# Patient Record
Sex: FEMALE | Race: WHITE | NOT HISPANIC OR LATINO | Employment: FULL TIME | URBAN - METROPOLITAN AREA
[De-identification: names, ages, dates, MRNs, and addresses within clinical notes are randomized per-mention and may not be internally consistent; named-entity substitution may affect disease eponyms.]

---

## 2021-10-13 LAB
SARS-COV-2, PCR: NOT DETECTED
SPECIMEN SOURCE: NORMAL

## 2023-05-26 LAB
ALANINE AMINOTRANSFERASE (SGPT) (U/L) IN SER/PLAS: 16 U/L (ref 7–45)
ALBUMIN (G/DL) IN SER/PLAS: 4.3 G/DL (ref 3.4–5)
ALKALINE PHOSPHATASE (U/L) IN SER/PLAS: 79 U/L (ref 33–110)
ANION GAP IN SER/PLAS: 11 MMOL/L (ref 10–20)
ASPARTATE AMINOTRANSFERASE (SGOT) (U/L) IN SER/PLAS: 15 U/L (ref 9–39)
BASOPHILS (10*3/UL) IN BLOOD BY AUTOMATED COUNT: 0.1 X10E9/L (ref 0–0.1)
BASOPHILS/100 LEUKOCYTES IN BLOOD BY AUTOMATED COUNT: 0.8 % (ref 0–2)
BILIRUBIN TOTAL (MG/DL) IN SER/PLAS: 0.3 MG/DL (ref 0–1.2)
CALCIUM (MG/DL) IN SER/PLAS: 9.7 MG/DL (ref 8.6–10.6)
CARBON DIOXIDE, TOTAL (MMOL/L) IN SER/PLAS: 29 MMOL/L (ref 21–32)
CHLORIDE (MMOL/L) IN SER/PLAS: 101 MMOL/L (ref 98–107)
CREATININE (MG/DL) IN SER/PLAS: 0.61 MG/DL (ref 0.5–1.05)
EOSINOPHILS (10*3/UL) IN BLOOD BY AUTOMATED COUNT: 0.26 X10E9/L (ref 0–0.7)
EOSINOPHILS/100 LEUKOCYTES IN BLOOD BY AUTOMATED COUNT: 2 % (ref 0–6)
ERYTHROCYTE DISTRIBUTION WIDTH (RATIO) BY AUTOMATED COUNT: 13.3 % (ref 11.5–14.5)
ERYTHROCYTE MEAN CORPUSCULAR HEMOGLOBIN CONCENTRATION (G/DL) BY AUTOMATED: 33.3 G/DL (ref 32–36)
ERYTHROCYTE MEAN CORPUSCULAR VOLUME (FL) BY AUTOMATED COUNT: 89 FL (ref 80–100)
ERYTHROCYTES (10*6/UL) IN BLOOD BY AUTOMATED COUNT: 4.61 X10E12/L (ref 4–5.2)
GFR FEMALE: >90 ML/MIN/1.73M2
GLUCOSE (MG/DL) IN SER/PLAS: 119 MG/DL (ref 74–99)
HEMATOCRIT (%) IN BLOOD BY AUTOMATED COUNT: 41.2 % (ref 36–46)
HEMOGLOBIN (G/DL) IN BLOOD: 13.7 G/DL (ref 12–16)
HEPATITIS C VIRUS AB PRESENCE IN SERUM: NONREACTIVE
HIV 1/ 2 AG/AB SCREEN: NONREACTIVE
IMMATURE GRANULOCYTES/100 LEUKOCYTES IN BLOOD BY AUTOMATED COUNT: 0.4 % (ref 0–0.9)
LEUKOCYTES (10*3/UL) IN BLOOD BY AUTOMATED COUNT: 12.7 X10E9/L (ref 4.4–11.3)
LYMPHOCYTES (10*3/UL) IN BLOOD BY AUTOMATED COUNT: 2.91 X10E9/L (ref 1.2–4.8)
LYMPHOCYTES/100 LEUKOCYTES IN BLOOD BY AUTOMATED COUNT: 22.9 % (ref 13–44)
MONOCYTES (10*3/UL) IN BLOOD BY AUTOMATED COUNT: 0.5 X10E9/L (ref 0.1–1)
MONOCYTES/100 LEUKOCYTES IN BLOOD BY AUTOMATED COUNT: 3.9 % (ref 2–10)
NEUTROPHILS (10*3/UL) IN BLOOD BY AUTOMATED COUNT: 8.9 X10E9/L (ref 1.2–7.7)
NEUTROPHILS/100 LEUKOCYTES IN BLOOD BY AUTOMATED COUNT: 70 % (ref 40–80)
NRBC (PER 100 WBCS) BY AUTOMATED COUNT: 0 /100 WBC (ref 0–0)
PLATELETS (10*3/UL) IN BLOOD AUTOMATED COUNT: 318 X10E9/L (ref 150–450)
POTASSIUM (MMOL/L) IN SER/PLAS: 3.8 MMOL/L (ref 3.5–5.3)
PROTEIN TOTAL: 6.9 G/DL (ref 6.4–8.2)
SODIUM (MMOL/L) IN SER/PLAS: 137 MMOL/L (ref 136–145)
THYROTROPIN (MIU/L) IN SER/PLAS BY DETECTION LIMIT <= 0.05 MIU/L: 0.98 MIU/L (ref 0.44–3.98)
UREA NITROGEN (MG/DL) IN SER/PLAS: 12 MG/DL (ref 6–23)

## 2023-05-27 LAB
CHLAMYDIA TRACH., AMPLIFIED: NEGATIVE
N. GONORRHEA, AMPLIFIED: NEGATIVE

## 2023-08-31 LAB
CHLAMYDIA TRACH., AMPLIFIED: NEGATIVE
N. GONORRHEA, AMPLIFIED: NEGATIVE

## 2023-09-26 PROBLEM — M17.12 PRIMARY OSTEOARTHRITIS OF LEFT KNEE: Status: ACTIVE | Noted: 2023-09-26

## 2023-09-26 PROBLEM — E66.9 OBESITY: Status: ACTIVE | Noted: 2023-09-26

## 2023-09-26 PROBLEM — E87.6 HYPOKALEMIA: Status: ACTIVE | Noted: 2023-09-26

## 2023-09-26 PROBLEM — R09.81 SINUS CONGESTION: Status: ACTIVE | Noted: 2023-09-26

## 2023-09-26 PROBLEM — J02.9 SORE THROAT: Status: ACTIVE | Noted: 2023-09-26

## 2023-09-26 PROBLEM — M79.602 PAIN OF LEFT UPPER EXTREMITY: Status: ACTIVE | Noted: 2023-09-26

## 2023-09-26 PROBLEM — R06.83 SNORING: Status: ACTIVE | Noted: 2023-09-26
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
## 2023-09-26 PROBLEM — B96.89 BACTERIAL VAGINITIS: Status: ACTIVE | Noted: 2023-09-26

## 2023-09-26 PROBLEM — J34.89 STUFFY AND RUNNY NOSE: Status: ACTIVE | Noted: 2023-09-26

## 2023-09-26 PROBLEM — N76.0 BACTERIAL VAGINITIS: Status: ACTIVE | Noted: 2023-09-26

## 2023-09-26 PROBLEM — U07.1 COVID-19 VIRUS INFECTION: Status: ACTIVE | Noted: 2023-09-26

## 2023-09-26 PROBLEM — M25.562 LEFT KNEE PAIN: Status: ACTIVE | Noted: 2023-09-26

## 2023-09-26 PROBLEM — T30.0 BURN: Status: ACTIVE | Noted: 2023-09-26

## 2023-09-26 PROBLEM — R05.9 COUGH: Status: ACTIVE | Noted: 2023-09-26

## 2023-09-26 PROBLEM — R03.0 ELEVATED BLOOD PRESSURE READING: Status: ACTIVE | Noted: 2023-09-26

## 2023-09-26 PROBLEM — K21.9 ESOPHAGEAL REFLUX: Status: ACTIVE | Noted: 2023-09-26

## 2023-09-26 PROBLEM — N83.209 OVARIAN CYST: Status: ACTIVE | Noted: 2023-09-26
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
## 2023-09-26 PROBLEM — R53.83 FATIGUE: Status: ACTIVE | Noted: 2023-09-26

## 2023-09-26 PROBLEM — M25.561 RIGHT KNEE PAIN: Status: ACTIVE | Noted: 2023-09-26

## 2023-09-26 PROBLEM — R40.0 DAYTIME SLEEPINESS: Status: ACTIVE | Noted: 2023-09-26

## 2023-09-26 PROBLEM — R10.13 EPIGASTRIC PAIN: Status: ACTIVE | Noted: 2023-09-26

## 2023-09-26 PROBLEM — R51.9 HEADACHE: Status: ACTIVE | Noted: 2023-09-26

## 2023-09-26 PROBLEM — F41.9 ANXIETY: Status: ACTIVE | Noted: 2023-09-26

## 2023-09-26 PROBLEM — S00.83XA FOREHEAD CONTUSION: Status: ACTIVE | Noted: 2023-09-26

## 2023-09-26 PROBLEM — N92.0 MENORRHAGIA: Status: ACTIVE | Noted: 2023-09-26

## 2023-09-26 PROBLEM — N63.22 BREAST LUMP ON LEFT SIDE AT 10 O'CLOCK POSITION: Status: ACTIVE | Noted: 2023-09-26

## 2023-09-26 PROBLEM — K13.79 MOUTH SORE: Status: ACTIVE | Noted: 2023-09-26

## 2023-09-26 PROBLEM — E88.810 DYSMETABOLIC SYNDROME: Status: ACTIVE | Noted: 2023-09-26

## 2023-09-26 PROBLEM — R60.9 EDEMA: Status: ACTIVE | Noted: 2023-09-26

## 2023-09-26 PROBLEM — S83.249A MEDIAL MENISCUS TEAR: Status: ACTIVE | Noted: 2023-09-26

## 2023-09-26 PROBLEM — E66.3 OVERWEIGHT: Status: ACTIVE | Noted: 2023-09-26

## 2023-09-26 PROBLEM — M25.511 SHOULDER PAIN, RIGHT: Status: ACTIVE | Noted: 2023-09-26

## 2023-09-26 PROBLEM — I10 BENIGN HYPERTENSION: Status: ACTIVE | Noted: 2023-09-26

## 2023-09-26 PROBLEM — R03.0 BLOOD PRESSURE ELEVATED WITHOUT HISTORY OF HTN: Status: ACTIVE | Noted: 2023-09-26

## 2023-09-26 RX ORDER — MEDROXYPROGESTERONE ACETATE 10 MG/1
TABLET ORAL
COMMUNITY
Start: 2022-12-28 | End: 2023-10-12 | Stop reason: WASHOUT

## 2023-09-26 RX ORDER — HYDROCHLOROTHIAZIDE 12.5 MG/1
CAPSULE ORAL EVERY 24 HOURS
COMMUNITY
End: 2023-12-18 | Stop reason: SDUPTHER

## 2023-09-26 RX ORDER — METRONIDAZOLE 500 MG/1
TABLET ORAL
COMMUNITY
Start: 2022-11-27 | End: 2023-10-12 | Stop reason: WASHOUT

## 2023-09-26 RX ORDER — MULTIVITAMIN
1 TABLET ORAL DAILY
COMMUNITY
End: 2023-10-12 | Stop reason: WASHOUT

## 2023-09-26 RX ORDER — MULTIVITAMIN
TABLET ORAL
COMMUNITY
End: 2023-10-12 | Stop reason: WASHOUT

## 2023-09-26 RX ORDER — HYDROCHLOROTHIAZIDE 12.5 MG/1
1 TABLET ORAL
COMMUNITY
Start: 2015-08-21 | End: 2023-10-12 | Stop reason: WASHOUT

## 2023-09-26 RX ORDER — IVERMECTIN 10 MG/G
CREAM TOPICAL
COMMUNITY
End: 2023-10-12 | Stop reason: WASHOUT

## 2023-09-26 RX ORDER — NORETHINDRONE 0.35 MG/1
TABLET ORAL
COMMUNITY
Start: 2023-04-01 | End: 2023-12-04

## 2023-10-12 ENCOUNTER — PREP FOR PROCEDURE (OUTPATIENT)
Dept: OBSTETRICS AND GYNECOLOGY | Facility: CLINIC | Age: 48
End: 2023-10-12

## 2023-10-12 ENCOUNTER — OFFICE VISIT (OUTPATIENT)
Dept: OBSTETRICS AND GYNECOLOGY | Facility: CLINIC | Age: 48
End: 2023-10-12
Payer: COMMERCIAL

## 2023-10-12 VITALS
DIASTOLIC BLOOD PRESSURE: 62 MMHG | HEIGHT: 61 IN | WEIGHT: 223 LBS | BODY MASS INDEX: 42.1 KG/M2 | SYSTOLIC BLOOD PRESSURE: 110 MMHG

## 2023-10-12 DIAGNOSIS — N93.9 ABNORMAL UTERINE BLEEDING (AUB): Primary | ICD-10-CM

## 2023-10-12 PROCEDURE — 3078F DIAST BP <80 MM HG: CPT | Performed by: OBSTETRICS & GYNECOLOGY

## 2023-10-12 PROCEDURE — 3074F SYST BP LT 130 MM HG: CPT | Performed by: OBSTETRICS & GYNECOLOGY

## 2023-10-12 PROCEDURE — 99213 OFFICE O/P EST LOW 20 MIN: CPT | Performed by: OBSTETRICS & GYNECOLOGY

## 2023-10-12 NOTE — PROGRESS NOTES
Subjective   Patient ID: Abby Chisholm is a 48 y.o. female who presents for Follow-up.  HPI      47 y.o. G0 female presents for AUB.  H/O endometrial ablation and salpingectomy in 07/2020 with me.   2013 myosure resection of submucosal fibroid.  Continued to get regular cycles, every 24 days, lasting 7 days.   Seen for episode of AUB in 12/2022. Normal labs including CBC, TSH, prolactin. Pelvic US 01/02/2023 showed uterine fibroids, 1-3 mm, endometrium 8 mm.  Wanted to try birth control pills to see if this helped. Given h/o HTN, she was started on POPs and has been on them since.   She was doing okay with this then started having bleeding on 08/2023 and had daily heavy irregular bleeding during August.   She saw Katlin in August 2023 and wanted to meet with me to discuss options from here.   She is a non-smoker.   Medical history significant for anxiety, arthritis, hypertension.     Review of Systems    Objective   Physical Exam  Constitutional:       Appearance: Normal appearance.   Neurological:      Mental Status: She is alert.   Psychiatric:         Mood and Affect: Mood normal.      exam deferred.    Assessment/Plan     Abnormal uterine bleeding prior Endometrial ablation, Hydrothermal endometrial ablation.   We reviewed options.   We discussed continued medical management with progesterone only OC's, repeat endometrial ablation vs definitive surgical therapy with hysterectomy.   Pt wants to proceed with hydrothermal endometrial ablation, repeat procedure.   Return to office for endometrial biopsy.   Will continue on progesterone only OC's until the procedure.

## 2023-10-20 ENCOUNTER — OFFICE VISIT (OUTPATIENT)
Dept: OTOLARYNGOLOGY | Facility: CLINIC | Age: 48
End: 2023-10-20
Payer: COMMERCIAL

## 2023-10-20 VITALS — WEIGHT: 229.6 LBS | HEIGHT: 61 IN | BODY MASS INDEX: 43.35 KG/M2 | TEMPERATURE: 96.9 F

## 2023-10-20 DIAGNOSIS — K14.0 GLOSSITIS: Primary | ICD-10-CM

## 2023-10-20 PROCEDURE — 1036F TOBACCO NON-USER: CPT | Performed by: OTOLARYNGOLOGY

## 2023-10-20 PROCEDURE — 99213 OFFICE O/P EST LOW 20 MIN: CPT | Performed by: OTOLARYNGOLOGY

## 2023-10-20 ASSESSMENT — PATIENT HEALTH QUESTIONNAIRE - PHQ9
1. LITTLE INTEREST OR PLEASURE IN DOING THINGS: NOT AT ALL
SUM OF ALL RESPONSES TO PHQ9 QUESTIONS 1 & 2: 0
2. FEELING DOWN, DEPRESSED OR HOPELESS: NOT AT ALL

## 2023-10-20 NOTE — PROGRESS NOTES
HPI  Abby Chisholm is a 48 y.o. female here in 1 month follow-up on glossitis.  She is feeling well without any pain right now.  She used Guzman's Solution x1 course      Past Medical History:   Diagnosis Date    Acute pharyngitis, unspecified 01/14/2022    Sore throat    Anxiety disorder, unspecified 07/28/2021    Anxiety    Contusion of other part of head, initial encounter 10/24/2017    Forehead contusion    Cough, unspecified 01/14/2022    Cough    COVID-19     COVID-19 virus infection    Dorsalgia, unspecified 05/13/2016    Back pain    Edema, unspecified 11/22/2017    Edema    Elevated blood-pressure reading, without diagnosis of hypertension 02/21/2022    Blood pressure elevated without history of HTN    Encounter for immunization 09/21/2020    Need for tetanus booster    Encounter for immunization 09/24/2021    Flu vaccine need    Encounter for other screening for malignant neoplasm of breast 12/16/2022    Breast screening    Encounter for screening for malignant neoplasm of colon 10/08/2021    Colon cancer screening    Epigastric pain 03/31/2016    Epigastric pain    Essential (primary) hypertension 07/15/2022    Benign hypertension    Hypokalemia 05/13/2016    Hypokalemia    Nasal congestion 03/21/2022    Sinus congestion    Other abnormal and inconclusive findings on diagnostic imaging of breast 10/12/2020    Abnormal mammogram    Other fatigue 12/17/2021    Fatigue    Other lesions of oral mucosa 07/19/2016    Mouth sore    Other specified disorders of nose and nasal sinuses 01/14/2022    Stuffy and runny nose    Ovarian cyst     Ovarian cyst    Overweight 10/18/2019    Overweight    Pain in left arm 02/21/2022    Pain of left upper extremity    Pain in right toe(s) 09/24/2021    Toe pain, right    Pain, unspecified 02/21/2022    Aches    Somnolence 12/17/2021    Daytime sleepiness    Unspecified abdominal pain 03/31/2016    Abdominal pain    Unspecified fall, initial encounter 10/24/2017    Fall at  "home    Viral intestinal infection, unspecified 04/20/2022    Viral gastroenteritis    Vitamin D deficiency, unspecified 03/21/2022    Vitamin D deficiency            Medications:     Current Outpatient Medications:     hydroCHLOROthiazide (Microzide) 12.5 mg capsule, once every 24 hours., Disp: , Rfl:     norethindrone (Micronor) 0.35 mg tablet, , Disp: , Rfl:      Allergies:  Allergies   Allergen Reactions    Latex, Natural Rubber Rash and Shortness of breath    Tree Nuts Anaphylaxis    Hydrocodone-Acetaminophen Hives    Levofloxacin Swelling and Dry mouth        Physical Exam:  Last Recorded Vitals  Temperature 36.1 °C (96.9 °F), height 1.549 m (5' 1\"), weight 104 kg (229 lb 9.6 oz), last menstrual period 09/16/2023.  General:     General appearance: Well-developed, well-nourished in no acute distress.       Voice:  normal       Head/face: Normal appearance; nontender to palpation     Facial nerve function: Normal and symmetric bilaterally.    Oral/oropharynx:     Oral vestibule: Normal labial and gingival mucosa     Tongue/floor of mouth: Pink, deep fissures.  She does not have specific coating or ulceration and no specific lesions     Oropharynx: Clear.  No lesions present of the hard/soft palate, posterior pharynx    Neck:     Neck: Normal appearance, trachea midline     Salivary glands: Normal to palpation bilaterally     Lymph nodes: No cervical lymphadenopathy to palpation     Thyroid: No thyromegaly.  No palpable nodules     Range of motion: Normal    Neurological:     Cortical functions: Alert and oriented x3, appropriate affect       Larynx/hypopharynx:     Laryngeal findings: Mirror exam inadequate or limited secondary to enlarged base of tongue and/or excessive gagging    Ear:     Ear canal: Normal bilaterally     Tympanic membrane: Intact and mobile bilaterally     Pinna: Normal bilaterally     Hearing:  Gross hearing assessment normal by voice    Nose:     Visualized using: Anterior rhinoscopy     " Nasopharynx: Inadequate mirror exam secondary to gag, anatomy.       Nasal dorsum: Nontraumatic midline appearance     Septum: Midline     Inferior turbinates: Normally sized     Mucosa: Bilateral, pink, normal appearing       Assessment/Plan   She is feeling well now.  She does not have any suspicious findings.  Recommend observe for now and if symptoms recur, may resume [place] and may consider further work-up or treatment at that time         Orestes Scanlon MD

## 2023-10-24 ENCOUNTER — OFFICE VISIT (OUTPATIENT)
Dept: PRIMARY CARE | Facility: CLINIC | Age: 48
End: 2023-10-24
Payer: COMMERCIAL

## 2023-10-24 ENCOUNTER — LAB (OUTPATIENT)
Dept: LAB | Facility: LAB | Age: 48
End: 2023-10-24
Payer: COMMERCIAL

## 2023-10-24 VITALS — DIASTOLIC BLOOD PRESSURE: 79 MMHG | WEIGHT: 227 LBS | BODY MASS INDEX: 42.89 KG/M2 | SYSTOLIC BLOOD PRESSURE: 120 MMHG

## 2023-10-24 DIAGNOSIS — R79.9 ABNORMAL BLOOD CHEMISTRY: Primary | ICD-10-CM

## 2023-10-24 DIAGNOSIS — R79.9 ABNORMAL BLOOD CHEMISTRY: ICD-10-CM

## 2023-10-24 LAB
ANION GAP SERPL CALC-SCNC: 15 MMOL/L (ref 10–20)
BUN SERPL-MCNC: 14 MG/DL (ref 6–23)
CALCIUM SERPL-MCNC: 9.2 MG/DL (ref 8.6–10.6)
CHLORIDE SERPL-SCNC: 103 MMOL/L (ref 98–107)
CO2 SERPL-SCNC: 25 MMOL/L (ref 21–32)
CREAT SERPL-MCNC: 0.55 MG/DL (ref 0.5–1.05)
EST. AVERAGE GLUCOSE BLD GHB EST-MCNC: 114 MG/DL
GFR SERPL CREATININE-BSD FRML MDRD: >90 ML/MIN/1.73M*2
GLUCOSE SERPL-MCNC: 94 MG/DL (ref 74–99)
HBA1C MFR BLD: 5.6 %
POTASSIUM SERPL-SCNC: 4 MMOL/L (ref 3.5–5.3)
SODIUM SERPL-SCNC: 139 MMOL/L (ref 136–145)

## 2023-10-24 PROCEDURE — 3078F DIAST BP <80 MM HG: CPT | Performed by: INTERNAL MEDICINE

## 2023-10-24 PROCEDURE — 80048 BASIC METABOLIC PNL TOTAL CA: CPT

## 2023-10-24 PROCEDURE — 1036F TOBACCO NON-USER: CPT | Performed by: INTERNAL MEDICINE

## 2023-10-24 PROCEDURE — 90471 IMMUNIZATION ADMIN: CPT | Performed by: INTERNAL MEDICINE

## 2023-10-24 PROCEDURE — 90682 RIV4 VACC RECOMBINANT DNA IM: CPT | Performed by: INTERNAL MEDICINE

## 2023-10-24 PROCEDURE — 36415 COLL VENOUS BLD VENIPUNCTURE: CPT

## 2023-10-24 PROCEDURE — 83036 HEMOGLOBIN GLYCOSYLATED A1C: CPT

## 2023-10-24 PROCEDURE — 3074F SYST BP LT 130 MM HG: CPT | Performed by: INTERNAL MEDICINE

## 2023-10-24 PROCEDURE — 99213 OFFICE O/P EST LOW 20 MIN: CPT | Performed by: INTERNAL MEDICINE

## 2023-10-24 RX ORDER — METFORMIN HYDROCHLORIDE 500 MG/1
500 TABLET ORAL
Qty: 60 TABLET | Refills: 2 | Status: SHIPPED | OUTPATIENT
Start: 2023-10-24 | End: 2024-01-24

## 2023-10-24 ASSESSMENT — PATIENT HEALTH QUESTIONNAIRE - PHQ9
2. FEELING DOWN, DEPRESSED OR HOPELESS: NOT AT ALL
SUM OF ALL RESPONSES TO PHQ9 QUESTIONS 1 AND 2: 0
1. LITTLE INTEREST OR PLEASURE IN DOING THINGS: NOT AT ALL

## 2023-10-24 NOTE — PROGRESS NOTES
Subjective   Patient ID: Abby Chisholm is a 48 y.o. female who presents for EPV (Weight issues. Pt cannot concentrate.).    HPI  Patient in for a visit  Was going to [employer] , work and not as motivated , parents her mom   Knee pain     Review of Systems  General: Denies fever, chills, night sweats,  Eyes: Negative for recent visual changes  Ears, Nose, Throat :  Negative for hearing changes, sinus discomfort  Dermatologic: Negative for new skin conditions, rash  Respiratory: Negative for wheezing, shortness of breath, cough  Cardiovascular: Negative for chest pain, palpitations, or leg swelling  Gastrointestinal: Negative for nausea/vomiting, abdominal pain, changes in bowel habits  Genitourinary NEGATIVE FOR URINARY INCONTINENCE urgency , frequency, discomfort   Musculoskeletal: see hpi  Neurological: Negative for headaches, dizziness    Previous history  Past Medical History:   Diagnosis Date    Acute pharyngitis, unspecified 01/14/2022    Sore throat    Anxiety disorder, unspecified 07/28/2021    Anxiety    Contusion of other part of head, initial encounter 10/24/2017    Forehead contusion    Cough, unspecified 01/14/2022    Cough    COVID-19     COVID-19 virus infection    Dorsalgia, unspecified 05/13/2016    Back pain    Edema, unspecified 11/22/2017    Edema    Elevated blood-pressure reading, without diagnosis of hypertension 02/21/2022    Blood pressure elevated without history of HTN    Encounter for immunization 09/21/2020    Need for tetanus booster    Encounter for immunization 09/24/2021    Flu vaccine need    Encounter for other screening for malignant neoplasm of breast 12/16/2022    Breast screening    Encounter for screening for malignant neoplasm of colon 10/08/2021    Colon cancer screening    Epigastric pain 03/31/2016    Epigastric pain    Essential (primary) hypertension 07/15/2022    Benign hypertension    Hypokalemia 05/13/2016    Hypokalemia    Nasal congestion 03/21/2022     Sinus congestion    Other abnormal and inconclusive findings on diagnostic imaging of breast 10/12/2020    Abnormal mammogram    Other fatigue 12/17/2021    Fatigue    Other lesions of oral mucosa 07/19/2016    Mouth sore    Other specified disorders of nose and nasal sinuses 01/14/2022    Stuffy and runny nose    Ovarian cyst     Ovarian cyst    Overweight 10/18/2019    Overweight    Pain in left arm 02/21/2022    Pain of left upper extremity    Pain in right toe(s) 09/24/2021    Toe pain, right    Pain, unspecified 02/21/2022    Aches    Somnolence 12/17/2021    Daytime sleepiness    Unspecified abdominal pain 03/31/2016    Abdominal pain    Unspecified fall, initial encounter 10/24/2017    Fall at home    Viral intestinal infection, unspecified 04/20/2022    Viral gastroenteritis    Vitamin D deficiency, unspecified 03/21/2022    Vitamin D deficiency     Past Surgical History:   Procedure Laterality Date    APPENDECTOMY  09/08/2014    Appendectomy    DILATION AND CURETTAGE OF UTERUS  09/08/2014    Dilation And Curettage    MOUTH SURGERY  09/08/2014    Oral Surgery Tooth Extraction    OTHER SURGICAL HISTORY  11/29/2018    Knee surgery     Social History     Tobacco Use    Smoking status: Never    Smokeless tobacco: Never   Substance Use Topics    Alcohol use: Yes     Alcohol/week: 2.0 standard drinks of alcohol     Types: 2 Glasses of wine per week     No family history on file.  Allergies   Allergen Reactions    Latex, Natural Rubber Rash and Shortness of breath    Tree Nuts Anaphylaxis    Hydrocodone-Acetaminophen Hives    Levaquin [Levofloxacin] Swelling and Dry mouth     Current Outpatient Medications   Medication Instructions    hydroCHLOROthiazide (Microzide) 12.5 mg capsule Every 24 hours    norethindrone (Micronor) 0.35 mg tablet        Objective       Physical Exam  Vital Signs: as recorded above  General: Well groomed, well nourished   Orientation:  Alert , oriented to time, place , and person   Mood and  Affect:  Cooperative , no apparent distress normal affect  Skin: Good color, good turgor  Eyes: Extra ocular muscle movements intact, anicteric sclerae  Neck: Supple, full range of movement  Chest: Normal breath sounds, normal chest wall exam, symmetric, good air entry, clear to auscultation  Heart: Regular rate and rhythm, without murmur, gallop, or rubs  BACK:  no CTLS spine tenderness, no flank tenderness  Extremities: full range of movement  bilateral UE and bilateral LE,  no lower extremity edema  Neurological: Alert, oriented, cranial nerves II-XII intact except for visual acuity  Sensation:  Intact   Gait: normal steady      Assessment/Plan   Abby Chisholm is a 48 y.o. female who presents for the concerns below:    Problem List Items Addressed This Visit    None   Weight needs motivation ,[employer] [place] mins away  HYPERGLYCEMIA  PLAN: Follow low carbohydrate diet, will consult nutrition if needed,  exercise as discussed, weight control. Start/Continue medications or adjust accordingly. Obtain HbA1c, BMP, urine microalbumin, ophthalmology exam  Discussed with:   Return in :    Portions of this note were generated using digital voice recognition software, and may contain grammatical errors       Camila Ndiaye MD  10/24/23  1:24 PM

## 2023-11-27 ENCOUNTER — PREP FOR PROCEDURE (OUTPATIENT)
Dept: OBSTETRICS AND GYNECOLOGY | Facility: CLINIC | Age: 48
End: 2023-11-27

## 2023-11-27 ENCOUNTER — PROCEDURE VISIT (OUTPATIENT)
Dept: OBSTETRICS AND GYNECOLOGY | Facility: CLINIC | Age: 48
End: 2023-11-27
Payer: COMMERCIAL

## 2023-11-27 VITALS — BODY MASS INDEX: 44.21 KG/M2 | SYSTOLIC BLOOD PRESSURE: 126 MMHG | DIASTOLIC BLOOD PRESSURE: 82 MMHG | WEIGHT: 234 LBS

## 2023-11-27 DIAGNOSIS — N93.9 ABNORMAL UTERINE BLEEDING (AUB): Primary | ICD-10-CM

## 2023-11-27 DIAGNOSIS — D25.9 UTERINE LEIOMYOMA, UNSPECIFIED LOCATION: ICD-10-CM

## 2023-11-27 DIAGNOSIS — Z32.02 PREGNANCY TEST NEGATIVE: ICD-10-CM

## 2023-11-27 LAB — PREGNANCY TEST URINE, POC: NEGATIVE

## 2023-11-27 PROCEDURE — 88305 TISSUE EXAM BY PATHOLOGIST: CPT | Performed by: STUDENT IN AN ORGANIZED HEALTH CARE EDUCATION/TRAINING PROGRAM

## 2023-11-27 PROCEDURE — 58100 BIOPSY OF UTERUS LINING: CPT | Performed by: OBSTETRICS & GYNECOLOGY

## 2023-11-27 PROCEDURE — 88305 TISSUE EXAM BY PATHOLOGIST: CPT

## 2023-11-27 PROCEDURE — 81025 URINE PREGNANCY TEST: CPT | Performed by: OBSTETRICS & GYNECOLOGY

## 2023-11-27 NOTE — PROGRESS NOTES
47 y.o. G0 female presents for endometrial biopsy for abnormal uterine bleeding.  After discussion of options, pt wants to proceed with hydrothermal endometrial ablation. We had reviewed options for AUB at previous visit 10/2022.     Hx as follows:  H/O endometrial ablation and salpingectomy in 07/2020 with me.   2013 myosure resection of submucosal fibroid.  Continued to get regular cycles, every 24 days, lasting 7 days.   Seen for episode of AUB in 12/2022. Normal labs including CBC, TSH, prolactin. Pelvic US 01/02/2023 showed uterine fibroids, 1-3 mm, endometrium 8 mm.  Wanted to try birth control pills to see if this helped. Given h/o HTN, she was started on POPs and has been on them since.   She was doing okay with this then started having bleeding on 08/2023 and had daily heavy irregular bleeding during August.   She is a non-smoker.   Medical history significant for anxiety, arthritis, hypertension.     /82   Wt 106 kg (234 lb)   LMP 11/07/2023   BMI 44.21 kg/m²      Endometrial biopsy    Date/Time: 11/27/2023 2:20 PM    Performed by: Kaye Mejia MD  Authorized by: Kaye Mejia MD    Consent:     Consent obtained: written    Consent given by: patient    Risks discussed: bleeding and infection    Patient agrees, verbalizes understanding, and wants to proceed: yes      Procedure explained and questions answered to patient or proxy's satisfaction: yes    Indications:     Indications: abnormal uterine bleeding      Chronicity of post-menopausal bleeding: chronic  Pre-procedure:     Urine pregnancy test: negative    Procedure:     Prepped with: Betadine    Tenaculum used: yes      A local block was performed: yes      Block method: paracervical block      Local anesthetic: lidocaine 1% w/o epi    Amount used (mL): 3    Number of passes: 3  Findings:     Cervix: normal      Uterus depth by sound (cm): 11    Specimen collected: specimen collected and sent to pathology      Patient tolerance: tolerated  well, no immediate complications     A/P  Abnormal uterine bleeding.   Plan for hysteroscopy dilation and curettage, hydrothermal endometrial ablation.   Reviewed with pt options for management of AUB.  Pt elects for repeat endometrial ablation. Reviewed expectations for surgery procedure, outcomes, risks, benefits.   Will schedule.

## 2023-12-01 DIAGNOSIS — N92.0 EXCESSIVE AND FREQUENT MENSTRUATION WITH REGULAR CYCLE: ICD-10-CM

## 2023-12-04 RX ORDER — NORETHINDRONE 0.35 MG/1
1 TABLET ORAL DAILY
Qty: 84 TABLET | Refills: 0 | Status: SHIPPED | OUTPATIENT
Start: 2023-12-04 | End: 2024-02-26

## 2023-12-07 LAB
LABORATORY COMMENT REPORT: NORMAL
PATH REPORT.FINAL DX SPEC: NORMAL
PATH REPORT.GROSS SPEC: NORMAL
PATH REPORT.RELEVANT HX SPEC: NORMAL
PATH REPORT.TOTAL CANCER: NORMAL

## 2023-12-12 PROBLEM — N93.9 ABNORMAL UTERINE BLEEDING (AUB): Status: ACTIVE | Noted: 2023-11-27

## 2023-12-12 PROBLEM — D25.9 UTERINE LEIOMYOMA: Status: ACTIVE | Noted: 2023-11-27

## 2023-12-18 ENCOUNTER — OFFICE VISIT (OUTPATIENT)
Dept: PRIMARY CARE | Facility: CLINIC | Age: 48
End: 2023-12-18
Payer: COMMERCIAL

## 2023-12-18 VITALS
HEIGHT: 61 IN | WEIGHT: 231 LBS | BODY MASS INDEX: 43.61 KG/M2 | DIASTOLIC BLOOD PRESSURE: 85 MMHG | SYSTOLIC BLOOD PRESSURE: 135 MMHG

## 2023-12-18 DIAGNOSIS — Z12.31 SCREENING MAMMOGRAM FOR BREAST CANCER: ICD-10-CM

## 2023-12-18 DIAGNOSIS — Z00.00 HEALTHCARE MAINTENANCE: ICD-10-CM

## 2023-12-18 DIAGNOSIS — I10 HYPERTENSION, UNSPECIFIED TYPE: Primary | ICD-10-CM

## 2023-12-18 PROCEDURE — 3079F DIAST BP 80-89 MM HG: CPT | Performed by: INTERNAL MEDICINE

## 2023-12-18 PROCEDURE — 1036F TOBACCO NON-USER: CPT | Performed by: INTERNAL MEDICINE

## 2023-12-18 PROCEDURE — 99396 PREV VISIT EST AGE 40-64: CPT | Performed by: INTERNAL MEDICINE

## 2023-12-18 PROCEDURE — 3075F SYST BP GE 130 - 139MM HG: CPT | Performed by: INTERNAL MEDICINE

## 2023-12-18 RX ORDER — HYDROCHLOROTHIAZIDE 12.5 MG/1
12.5 CAPSULE ORAL EVERY 24 HOURS
Qty: 90 CAPSULE | Refills: 1 | Status: SHIPPED | OUTPATIENT
Start: 2023-12-18 | End: 2024-03-18 | Stop reason: SDUPTHER

## 2023-12-18 ASSESSMENT — ENCOUNTER SYMPTOMS
OCCASIONAL FEELINGS OF UNSTEADINESS: 0
LOSS OF SENSATION IN FEET: 0
DEPRESSION: 0

## 2023-12-18 ASSESSMENT — COLUMBIA-SUICIDE SEVERITY RATING SCALE - C-SSRS
6. HAVE YOU EVER DONE ANYTHING, STARTED TO DO ANYTHING, OR PREPARED TO DO ANYTHING TO END YOUR LIFE?: NO
1. IN THE PAST MONTH, HAVE YOU WISHED YOU WERE DEAD OR WISHED YOU COULD GO TO SLEEP AND NOT WAKE UP?: NO
2. HAVE YOU ACTUALLY HAD ANY THOUGHTS OF KILLING YOURSELF?: NO

## 2023-12-18 ASSESSMENT — PATIENT HEALTH QUESTIONNAIRE - PHQ9
SUM OF ALL RESPONSES TO PHQ9 QUESTIONS 1 AND 2: 0
1. LITTLE INTEREST OR PLEASURE IN DOING THINGS: NOT AT ALL
2. FEELING DOWN, DEPRESSED OR HOPELESS: NOT AT ALL

## 2023-12-26 ENCOUNTER — LAB (OUTPATIENT)
Dept: LAB | Facility: LAB | Age: 48
End: 2023-12-26
Payer: COMMERCIAL

## 2023-12-26 DIAGNOSIS — R82.90 ABNORMAL URINE FINDINGS: Primary | ICD-10-CM

## 2023-12-26 DIAGNOSIS — Z00.00 HEALTHCARE MAINTENANCE: ICD-10-CM

## 2023-12-26 DIAGNOSIS — E78.5 HYPERLIPIDEMIA, UNSPECIFIED HYPERLIPIDEMIA TYPE: ICD-10-CM

## 2023-12-26 LAB
ALBUMIN SERPL BCP-MCNC: 4 G/DL (ref 3.4–5)
ALP SERPL-CCNC: 57 U/L (ref 33–110)
ALT SERPL W P-5'-P-CCNC: 13 U/L (ref 7–45)
ANION GAP SERPL CALC-SCNC: 11 MMOL/L (ref 10–20)
APPEARANCE UR: ABNORMAL
AST SERPL W P-5'-P-CCNC: 14 U/L (ref 9–39)
BASOPHILS # BLD AUTO: 0.07 X10*3/UL (ref 0–0.1)
BASOPHILS NFR BLD AUTO: 0.8 %
BILIRUB SERPL-MCNC: 0.4 MG/DL (ref 0–1.2)
BILIRUB UR STRIP.AUTO-MCNC: NEGATIVE MG/DL
BUN SERPL-MCNC: 10 MG/DL (ref 6–23)
CALCIUM SERPL-MCNC: 8.8 MG/DL (ref 8.6–10.3)
CHLORIDE SERPL-SCNC: 103 MMOL/L (ref 98–107)
CHOLEST SERPL-MCNC: 182 MG/DL (ref 0–199)
CHOLESTEROL/HDL RATIO: 3.5
CO2 SERPL-SCNC: 27 MMOL/L (ref 21–32)
COLOR UR: YELLOW
CREAT SERPL-MCNC: 0.56 MG/DL (ref 0.5–1.05)
EOSINOPHIL # BLD AUTO: 0.39 X10*3/UL (ref 0–0.7)
EOSINOPHIL NFR BLD AUTO: 4.6 %
ERYTHROCYTE [DISTWIDTH] IN BLOOD BY AUTOMATED COUNT: 13 % (ref 11.5–14.5)
GFR SERPL CREATININE-BSD FRML MDRD: >90 ML/MIN/1.73M*2
GLUCOSE SERPL-MCNC: 100 MG/DL (ref 74–99)
GLUCOSE UR STRIP.AUTO-MCNC: NEGATIVE MG/DL
HCT VFR BLD AUTO: 41.6 % (ref 36–46)
HDLC SERPL-MCNC: 52.7 MG/DL
HGB BLD-MCNC: 13.7 G/DL (ref 12–16)
IMM GRANULOCYTES # BLD AUTO: 0.04 X10*3/UL (ref 0–0.7)
IMM GRANULOCYTES NFR BLD AUTO: 0.5 % (ref 0–0.9)
KETONES UR STRIP.AUTO-MCNC: NEGATIVE MG/DL
LDLC SERPL CALC-MCNC: 94 MG/DL
LEUKOCYTE ESTERASE UR QL STRIP.AUTO: NEGATIVE
LYMPHOCYTES # BLD AUTO: 2.72 X10*3/UL (ref 1.2–4.8)
LYMPHOCYTES NFR BLD AUTO: 31.9 %
MCH RBC QN AUTO: 29.2 PG (ref 26–34)
MCHC RBC AUTO-ENTMCNC: 32.9 G/DL (ref 32–36)
MCV RBC AUTO: 89 FL (ref 80–100)
MONOCYTES # BLD AUTO: 0.4 X10*3/UL (ref 0.1–1)
MONOCYTES NFR BLD AUTO: 4.7 %
MUCOUS THREADS #/AREA URNS AUTO: NORMAL /LPF
NEUTROPHILS # BLD AUTO: 4.9 X10*3/UL (ref 1.2–7.7)
NEUTROPHILS NFR BLD AUTO: 57.5 %
NITRITE UR QL STRIP.AUTO: NEGATIVE
NON HDL CHOLESTEROL: 129 MG/DL (ref 0–149)
NRBC BLD-RTO: 0 /100 WBCS (ref 0–0)
PH UR STRIP.AUTO: 5 [PH]
PLATELET # BLD AUTO: 296 X10*3/UL (ref 150–450)
POTASSIUM SERPL-SCNC: 3.4 MMOL/L (ref 3.5–5.3)
PROT SERPL-MCNC: 6.2 G/DL (ref 6.4–8.2)
PROT UR STRIP.AUTO-MCNC: NEGATIVE MG/DL
RBC # BLD AUTO: 4.69 X10*6/UL (ref 4–5.2)
RBC # UR STRIP.AUTO: ABNORMAL /UL
RBC #/AREA URNS AUTO: NORMAL /HPF
SODIUM SERPL-SCNC: 138 MMOL/L (ref 136–145)
SP GR UR STRIP.AUTO: 1.01
SQUAMOUS #/AREA URNS AUTO: NORMAL /HPF
TRIGL SERPL-MCNC: 179 MG/DL (ref 0–149)
TSH SERPL-ACNC: 0.78 MIU/L (ref 0.44–3.98)
UROBILINOGEN UR STRIP.AUTO-MCNC: <2 MG/DL
VLDL: 36 MG/DL (ref 0–40)
WBC # BLD AUTO: 8.5 X10*3/UL (ref 4.4–11.3)
WBC #/AREA URNS AUTO: NORMAL /HPF

## 2023-12-26 PROCEDURE — 36415 COLL VENOUS BLD VENIPUNCTURE: CPT

## 2023-12-29 ENCOUNTER — HOSPITAL ENCOUNTER (OUTPATIENT)
Dept: RADIOLOGY | Facility: HOSPITAL | Age: 48
Discharge: HOME | End: 2023-12-29
Payer: COMMERCIAL

## 2023-12-29 DIAGNOSIS — Z12.31 SCREENING MAMMOGRAM FOR BREAST CANCER: ICD-10-CM

## 2023-12-29 PROCEDURE — 77063 BREAST TOMOSYNTHESIS BI: CPT | Performed by: STUDENT IN AN ORGANIZED HEALTH CARE EDUCATION/TRAINING PROGRAM

## 2023-12-29 PROCEDURE — 77063 BREAST TOMOSYNTHESIS BI: CPT

## 2023-12-29 PROCEDURE — 77067 SCR MAMMO BI INCL CAD: CPT | Performed by: STUDENT IN AN ORGANIZED HEALTH CARE EDUCATION/TRAINING PROGRAM

## 2024-01-24 DIAGNOSIS — R79.9 ABNORMAL BLOOD CHEMISTRY: ICD-10-CM

## 2024-01-24 RX ORDER — METFORMIN HYDROCHLORIDE 500 MG/1
500 TABLET ORAL
Qty: 60 TABLET | Refills: 2 | Status: SHIPPED | OUTPATIENT
Start: 2024-01-24 | End: 2024-05-16 | Stop reason: SDUPTHER

## 2024-02-26 DIAGNOSIS — N92.0 EXCESSIVE AND FREQUENT MENSTRUATION WITH REGULAR CYCLE: ICD-10-CM

## 2024-02-26 RX ORDER — NORETHINDRONE 0.35 MG/1
1 TABLET ORAL DAILY
Qty: 84 TABLET | Refills: 0 | Status: SHIPPED | OUTPATIENT
Start: 2024-02-26 | End: 2024-05-21

## 2024-02-27 ENCOUNTER — APPOINTMENT (OUTPATIENT)
Dept: ORTHOPEDIC SURGERY | Facility: CLINIC | Age: 49
End: 2024-02-27
Payer: COMMERCIAL

## 2024-03-05 ENCOUNTER — OFFICE VISIT (OUTPATIENT)
Dept: ORTHOPEDIC SURGERY | Facility: CLINIC | Age: 49
End: 2024-03-05
Payer: COMMERCIAL

## 2024-03-05 DIAGNOSIS — M17.12 PRIMARY OSTEOARTHRITIS OF LEFT KNEE: Primary | ICD-10-CM

## 2024-03-05 PROCEDURE — 1036F TOBACCO NON-USER: CPT | Performed by: FAMILY MEDICINE

## 2024-03-05 PROCEDURE — 99213 OFFICE O/P EST LOW 20 MIN: CPT | Performed by: FAMILY MEDICINE

## 2024-03-05 PROCEDURE — 20610 DRAIN/INJ JOINT/BURSA W/O US: CPT | Performed by: FAMILY MEDICINE

## 2024-03-05 RX ORDER — TRIAMCINOLONE ACETONIDE 40 MG/ML
40 INJECTION, SUSPENSION INTRA-ARTICULAR; INTRAMUSCULAR
Status: COMPLETED | OUTPATIENT
Start: 2024-03-05 | End: 2024-03-05

## 2024-03-05 RX ORDER — LIDOCAINE HYDROCHLORIDE 10 MG/ML
4 INJECTION INFILTRATION; PERINEURAL
Status: COMPLETED | OUTPATIENT
Start: 2024-03-05 | End: 2024-03-05

## 2024-03-05 RX ADMIN — LIDOCAINE HYDROCHLORIDE 4 ML: 10 INJECTION INFILTRATION; PERINEURAL at 13:50

## 2024-03-05 RX ADMIN — TRIAMCINOLONE ACETONIDE 40 MG: 40 INJECTION, SUSPENSION INTRA-ARTICULAR; INTRAMUSCULAR at 13:50

## 2024-03-05 NOTE — PROGRESS NOTES
** Please excuse any errors in grammar or translation related to this dictation. Voice recognition software was utilized to prepare this document. **    Assessment & Plan:  Patient here for ongoing management of left knee arthritis. Encourage strength training to optimize function of surrounding joint musculature; has conditioning handout at home from previous visit.  Continue to optimize weight as much as possible to reduce strain on joint. Discussed available options to mitigate her symptoms to include CSI, MANISHA, topical/oral analgesics. Elected to proceed with CSI today. She will consider MANISHA in the future and will notify clinic if wants to start prior auth process to obtain.  For intermittent symptoms can utilize over-the-counter NSAID or acetaminophen, ice pack or heating pad.  All questions answered and patient agrees to this plan.       Chief complaint:  Left knee pain    HPI:  3/5/24: Patient following up for left knee arthritis.  She was last seen in September and had a steroid injection completed at that time.  States show I received some mild relief following the injection which she attributes of working several long hours on her feet in the days immediately following.  Her knee pain is intermittent depending how much she is on her feet.  Denies any new injuries.  She states she is working with a dietitian in order to lose weight to promote her overall health and joint health.    9/12/24: Patient complains of left knee pain for approximately 3 years. Hx of left knee arthroscopy in 2017 for meniscal tear. Pain is localized to anterior knee. Swelling occurs intermittently. Most recently was being seen by Dr. Morrow for this condition. Last had CSI in April 2023 which helped reduce pain for 2 months. Had 2 previous injections prior to this. She uses ibuprofen, Tylenol arthritis, and voltaren gel as needed which give her a small amount of relief.     Exam:  LEFT Knee examined. No effusion, ecchymosis, or  erythema. AROM from 0 to 120 deg with 5/5 strength. SILT overlying knee. Motion crepitus present. No current tenderness along joint line or with patellar compression. Negative anterior and posterior drawer. No laxity to varus or valgus stress at 0 or 30 deg. No patellar apprehension.      Results:  Xrays of left knee obtained 12/16/22: advanced medial compartment degenerative changes with moderate changes of PF compartment.     Procedure:  Patient ID: Abby Chisholm is a 48 y.o. female.    L Inj/Asp: L knee on 3/5/2024 1:50 PM  Indications: pain  Details: 25 G needle, anteromedial approach  Medications: 40 mg triamcinolone acetonide 40 mg/mL; 4 mL lidocaine 10 mg/mL (1 %)  Outcome: tolerated well, no immediate complications  Procedure, treatment alternatives, risks and benefits explained, specific risks discussed. Consent was given by the patient. Immediately prior to procedure a time out was called to verify the correct patient, procedure, equipment, support staff and site/side marked as required. Patient was prepped and draped in the usual sterile fashion.

## 2024-03-08 ENCOUNTER — OFFICE VISIT (OUTPATIENT)
Dept: OTOLARYNGOLOGY | Facility: CLINIC | Age: 49
End: 2024-03-08
Payer: COMMERCIAL

## 2024-03-08 VITALS — BODY MASS INDEX: 43.16 KG/M2 | HEIGHT: 61 IN | TEMPERATURE: 97.3 F | WEIGHT: 228.6 LBS

## 2024-03-08 DIAGNOSIS — R51.9 FACIAL PAIN: ICD-10-CM

## 2024-03-08 DIAGNOSIS — J30.9 ALLERGIC RHINITIS, UNSPECIFIED SEASONALITY, UNSPECIFIED TRIGGER: Primary | ICD-10-CM

## 2024-03-08 PROCEDURE — 1036F TOBACCO NON-USER: CPT | Performed by: OTOLARYNGOLOGY

## 2024-03-08 PROCEDURE — 31231 NASAL ENDOSCOPY DX: CPT | Performed by: OTOLARYNGOLOGY

## 2024-03-08 PROCEDURE — 99213 OFFICE O/P EST LOW 20 MIN: CPT | Performed by: OTOLARYNGOLOGY

## 2024-03-08 NOTE — PROGRESS NOTES
HPI  Abby Chisholm is a 48 y.o. female seen within the last few months with glossitis which had resolved.  She is here today with some nasal pressure..  History of intraocular pressure elevations and is being evaluated and potentially treated for glaucoma.  She is using fluticasone as needed but does not feel like it helps much with the pressure between her eyes.  She is not routinely symptomatic.      Past Medical History:   Diagnosis Date    Acute pharyngitis, unspecified 01/14/2022    Sore throat    Anxiety disorder, unspecified 07/28/2021    Anxiety    Arthritis     Contusion of other part of head, initial encounter 10/24/2017    Forehead contusion    Cough, unspecified 01/14/2022    Cough    COVID-19     COVID-19 virus infection    Dorsalgia, unspecified 05/13/2016    Back pain    Edema, unspecified 11/22/2017    Edema    Elevated blood-pressure reading, without diagnosis of hypertension 02/21/2022    Blood pressure elevated without history of HTN    Encounter for immunization 09/21/2020    Need for tetanus booster    Encounter for immunization 09/24/2021    Flu vaccine need    Encounter for other screening for malignant neoplasm of breast 12/16/2022    Breast screening    Encounter for screening for malignant neoplasm of colon 10/08/2021    Colon cancer screening    Epigastric pain 03/31/2016    Epigastric pain    Essential (primary) hypertension 07/15/2022    Benign hypertension    Hypokalemia 05/13/2016    Hypokalemia    Nasal congestion 03/21/2022    Sinus congestion    Other abnormal and inconclusive findings on diagnostic imaging of breast 10/12/2020    Abnormal mammogram    Other fatigue 12/17/2021    Fatigue    Other lesions of oral mucosa 07/19/2016    Mouth sore    Other specified disorders of nose and nasal sinuses 01/14/2022    Stuffy and runny nose    Ovarian cyst     Ovarian cyst    Overweight 10/18/2019    Overweight    Pain in left arm 02/21/2022    Pain of left upper extremity    Pain in  "right toe(s) 09/24/2021    Toe pain, right    Pain, unspecified 02/21/2022    Aches    Somnolence 12/17/2021    Daytime sleepiness    Unspecified abdominal pain 03/31/2016    Abdominal pain    Unspecified fall, initial encounter 10/24/2017    Fall at home    Viral intestinal infection, unspecified 04/20/2022    Viral gastroenteritis    Vitamin D deficiency, unspecified 03/21/2022    Vitamin D deficiency            Medications:     Current Outpatient Medications:     hydroCHLOROthiazide (Microzide) 12.5 mg capsule, Take 1 capsule (12.5 mg) by mouth once every 24 hours., Disp: 90 capsule, Rfl: 1    latanoprost 0.005 % drops, emulsion, , Disp: , Rfl:     metFORMIN (Glucophage) 500 mg tablet, TAKE 1 TABLET BY MOUTH TWICE A DAY WITH MEALS, Disp: 60 tablet, Rfl: 2    norethindrone (Micronor) 0.35 mg tablet, TAKE 1 TABLET BY MOUTH EVERY DAY, Disp: 84 tablet, Rfl: 0     Allergies:  Allergies   Allergen Reactions    Latex, Natural Rubber Rash and Shortness of breath    Tree Nuts Anaphylaxis    Hydrocodone-Acetaminophen Hives    Levaquin [Levofloxacin] Swelling and Dry mouth        Physical Exam:  Last Recorded Vitals  Temperature 36.3 °C (97.3 °F), height 1.537 m (5' 0.5\"), weight 104 kg (228 lb 9.6 oz).  General:     General appearance: Well-developed, well-nourished in no acute distress.       Voice:  normal       Head/face: Normal appearance; nontender to palpation     Facial nerve function: Normal and symmetric bilaterally.    Oral/oropharynx:     Oral vestibule: Normal labial and gingival mucosa     Tongue/floor of mouth: Pink, deep fissures.  She does not have specific coating or ulceration and no specific lesions     Oropharynx: Clear.  No lesions present of the hard/soft palate, posterior pharynx    Neck:     Neck: Normal appearance, trachea midline     Salivary glands: Normal to palpation bilaterally     Lymph nodes: No cervical lymphadenopathy to palpation     Thyroid: No thyromegaly.  No palpable nodules     Range " of motion: Normal    Neurological:     Cortical functions: Alert and oriented x3, appropriate affect       Larynx/hypopharynx:     Laryngeal findings: Mirror exam inadequate or limited secondary to enlarged base of tongue and/or excessive gagging    Ear:     Ear canal: Normal bilaterally     Tympanic membrane: Intact and mobile bilaterally     Pinna: Normal bilaterally     Hearing:  Gross hearing assessment normal by voice    Nose:     Visualized using: Flexible nasal endoscopy utilized secondary to inadequate anterior rhinoscopy  Nasopharynx: Inadequate mirror examination as above, endoscopy demonstrates normal nasopharynx without obstruction or mass   nasal dorsum: Nontraumatic midline appearance     Septum: Midline     Inferior turbinates: Normally sized     Mucosa: Bilateral, pink, normal appearing.  No pus or polyps.  Right antrostomy is large.  Healthy maxillary antral mucosa      Assessment/Plan   She is doing well.  Her physical exam in the nose looks good.  Recommend stop nasal steroids especially since they are not helping her and consider azelastine.  Recheck if symptoms recur, sooner as needed         Orestes Scanlon MD

## 2024-03-09 ENCOUNTER — LAB (OUTPATIENT)
Dept: LAB | Facility: LAB | Age: 49
End: 2024-03-09
Payer: COMMERCIAL

## 2024-03-09 DIAGNOSIS — E78.5 HYPERLIPIDEMIA, UNSPECIFIED HYPERLIPIDEMIA TYPE: ICD-10-CM

## 2024-03-09 LAB
ALBUMIN SERPL BCP-MCNC: 3.9 G/DL (ref 3.4–5)
ALP SERPL-CCNC: 60 U/L (ref 33–110)
ALT SERPL W P-5'-P-CCNC: 44 U/L (ref 7–45)
ANION GAP SERPL CALC-SCNC: 14 MMOL/L (ref 10–20)
AST SERPL W P-5'-P-CCNC: 24 U/L (ref 9–39)
BILIRUB SERPL-MCNC: 0.5 MG/DL (ref 0–1.2)
BUN SERPL-MCNC: 14 MG/DL (ref 6–23)
CALCIUM SERPL-MCNC: 8.7 MG/DL (ref 8.6–10.3)
CHLORIDE SERPL-SCNC: 100 MMOL/L (ref 98–107)
CHOLEST SERPL-MCNC: 160 MG/DL (ref 0–199)
CHOLESTEROL/HDL RATIO: 3.1
CO2 SERPL-SCNC: 29 MMOL/L (ref 21–32)
CREAT SERPL-MCNC: 0.69 MG/DL (ref 0.5–1.05)
EGFRCR SERPLBLD CKD-EPI 2021: >90 ML/MIN/1.73M*2
GLUCOSE SERPL-MCNC: 88 MG/DL (ref 74–99)
HDLC SERPL-MCNC: 52.2 MG/DL
LDLC SERPL CALC-MCNC: 84 MG/DL
NON HDL CHOLESTEROL: 108 MG/DL (ref 0–149)
POTASSIUM SERPL-SCNC: 4.3 MMOL/L (ref 3.5–5.3)
PROT SERPL-MCNC: 6.4 G/DL (ref 6.4–8.2)
SODIUM SERPL-SCNC: 139 MMOL/L (ref 136–145)
TRIGL SERPL-MCNC: 120 MG/DL (ref 0–149)
VLDL: 24 MG/DL (ref 0–40)

## 2024-03-09 PROCEDURE — 80061 LIPID PANEL: CPT

## 2024-03-09 PROCEDURE — 36415 COLL VENOUS BLD VENIPUNCTURE: CPT

## 2024-03-09 PROCEDURE — 80053 COMPREHEN METABOLIC PANEL: CPT

## 2024-03-13 ENCOUNTER — APPOINTMENT (OUTPATIENT)
Dept: OPHTHALMOLOGY | Facility: CLINIC | Age: 49
End: 2024-03-13
Payer: COMMERCIAL

## 2024-03-18 ENCOUNTER — OFFICE VISIT (OUTPATIENT)
Dept: PRIMARY CARE | Facility: CLINIC | Age: 49
End: 2024-03-18
Payer: COMMERCIAL

## 2024-03-18 VITALS — DIASTOLIC BLOOD PRESSURE: 82 MMHG | WEIGHT: 223 LBS | SYSTOLIC BLOOD PRESSURE: 120 MMHG | BODY MASS INDEX: 42.83 KG/M2

## 2024-03-18 DIAGNOSIS — I10 BENIGN HYPERTENSION: Primary | ICD-10-CM

## 2024-03-18 DIAGNOSIS — J31.0 RHINITIS, UNSPECIFIED TYPE: ICD-10-CM

## 2024-03-18 DIAGNOSIS — H40.9 GLAUCOMA, UNSPECIFIED GLAUCOMA TYPE, UNSPECIFIED LATERALITY: ICD-10-CM

## 2024-03-18 DIAGNOSIS — E78.00 HYPERCHOLESTEROLEMIA: ICD-10-CM

## 2024-03-18 PROCEDURE — 1036F TOBACCO NON-USER: CPT | Performed by: INTERNAL MEDICINE

## 2024-03-18 PROCEDURE — 3074F SYST BP LT 130 MM HG: CPT | Performed by: INTERNAL MEDICINE

## 2024-03-18 PROCEDURE — 99214 OFFICE O/P EST MOD 30 MIN: CPT | Performed by: INTERNAL MEDICINE

## 2024-03-18 PROCEDURE — 3079F DIAST BP 80-89 MM HG: CPT | Performed by: INTERNAL MEDICINE

## 2024-03-18 RX ORDER — AZELASTINE 1 MG/ML
1 SPRAY, METERED NASAL 2 TIMES DAILY
Qty: 30 ML | Refills: 12 | Status: SHIPPED | OUTPATIENT
Start: 2024-03-18 | End: 2025-03-18

## 2024-03-18 RX ORDER — HYDROCHLOROTHIAZIDE 12.5 MG/1
12.5 CAPSULE ORAL EVERY 24 HOURS
Qty: 90 CAPSULE | Refills: 1 | Status: SHIPPED | OUTPATIENT
Start: 2024-03-18

## 2024-03-18 ASSESSMENT — COLUMBIA-SUICIDE SEVERITY RATING SCALE - C-SSRS
1. IN THE PAST MONTH, HAVE YOU WISHED YOU WERE DEAD OR WISHED YOU COULD GO TO SLEEP AND NOT WAKE UP?: NO
2. HAVE YOU ACTUALLY HAD ANY THOUGHTS OF KILLING YOURSELF?: NO
6. HAVE YOU EVER DONE ANYTHING, STARTED TO DO ANYTHING, OR PREPARED TO DO ANYTHING TO END YOUR LIFE?: NO

## 2024-03-18 ASSESSMENT — ENCOUNTER SYMPTOMS
OCCASIONAL FEELINGS OF UNSTEADINESS: 0
LOSS OF SENSATION IN FEET: 0
DEPRESSION: 0

## 2024-03-18 NOTE — PROGRESS NOTES
Subjective   Patient ID: Abby Chisholm is a 48 y.o. female who presents for FUV.    HPI  Patient in for a visit  Will be getting surgery on wed, billingalready started billing her two weesk ago  Seeing dietitian on zoom ins covers , lipid LDL , eating overnight oats ,no more eggs  Skim milk ,did it even when travelling  with fruits fresh or frozen , TG is better   Review of Systems  General: Denies fever, chills, night sweats,  Eyes: Negative for recent visual changes  Ears, Nose, Throat :  Negative for hearing changes, sinus discomfort  Dermatologic: Negative for new skin conditions, rash  Respiratory: Negative for wheezing, shortness of breath, cough  Cardiovascular: Negative for chest pain, palpitations, or leg swelling  Gastrointestinal: Negative for nausea/vomiting, abdominal pain, changes in bowel habits  Genitourinary NEGATIVE FOR URINARY INCONTINENCE urgency , frequency, discomfort   Musculoskeletal: see hpi  Neurological: Negative for headaches, dizziness    Previous history  Past Medical History:   Diagnosis Date    Acute pharyngitis, unspecified 01/14/2022    Sore throat    Anxiety disorder, unspecified 07/28/2021    Anxiety    Arthritis     Contusion of other part of head, initial encounter 10/24/2017    Forehead contusion    Cough, unspecified 01/14/2022    Cough    COVID-19     COVID-19 virus infection    Dorsalgia, unspecified 05/13/2016    Back pain    Edema, unspecified 11/22/2017    Edema    Elevated blood-pressure reading, without diagnosis of hypertension 02/21/2022    Blood pressure elevated without history of HTN    Encounter for immunization 09/21/2020    Need for tetanus booster    Encounter for immunization 09/24/2021    Flu vaccine need    Encounter for other screening for malignant neoplasm of breast 12/16/2022    Breast screening    Encounter for screening for malignant neoplasm of colon 10/08/2021    Colon cancer screening    Epigastric pain 03/31/2016    Epigastric pain     Essential (primary) hypertension 07/15/2022    Benign hypertension    Hypokalemia 05/13/2016    Hypokalemia    Nasal congestion 03/21/2022    Sinus congestion    Other abnormal and inconclusive findings on diagnostic imaging of breast 10/12/2020    Abnormal mammogram    Other fatigue 12/17/2021    Fatigue    Other lesions of oral mucosa 07/19/2016    Mouth sore    Other specified disorders of nose and nasal sinuses 01/14/2022    Stuffy and runny nose    Ovarian cyst     Ovarian cyst    Overweight 10/18/2019    Overweight    Pain in left arm 02/21/2022    Pain of left upper extremity    Pain in right toe(s) 09/24/2021    Toe pain, right    Pain, unspecified 02/21/2022    Aches    Somnolence 12/17/2021    Daytime sleepiness    Unspecified abdominal pain 03/31/2016    Abdominal pain    Unspecified fall, initial encounter 10/24/2017    Fall at home    Viral intestinal infection, unspecified 04/20/2022    Viral gastroenteritis    Vitamin D deficiency, unspecified 03/21/2022    Vitamin D deficiency     Past Surgical History:   Procedure Laterality Date    APPENDECTOMY  09/08/2014    Appendectomy    DILATION AND CURETTAGE OF UTERUS  09/08/2014    Dilation And Curettage    MOUTH SURGERY  09/08/2014    Oral Surgery Tooth Extraction    OTHER SURGICAL HISTORY  11/29/2018    Knee surgery     Social History     Tobacco Use    Smoking status: Never    Smokeless tobacco: Never   Vaping Use    Vaping Use: Never used   Substance Use Topics    Alcohol use: Yes     Alcohol/week: 2.0 standard drinks of alcohol     Types: 2 Glasses of wine per week    Drug use: Never     Family History   Problem Relation Name Age of Onset    Thyroid cancer Mother      Heart disease Father      Other (eating do) Sister      Breast cancer Paternal Grandmother  70     Allergies   Allergen Reactions    Latex, Natural Rubber Rash and Shortness of breath    Tree Nuts Anaphylaxis    Hydrocodone-Acetaminophen Hives    Levaquin [Levofloxacin] Swelling and Dry  mouth     Current Outpatient Medications   Medication Instructions    hydroCHLOROthiazide (MICROZIDE) 12.5 mg, oral, Every 24 hours    latanoprost 0.005 % drops, emulsion     metFORMIN (GLUCOPHAGE) 500 mg, oral, 2 times daily with meals    norethindrone (MICRONOR) 0.35 mg, oral, Daily       Objective       Physical Exam  Vital Signs: as recorded above  General: Well groomed, well nourished   Orientation:  Alert , oriented to time, place , and person   Mood and Affect:  Cooperative , no apparent distress normal affect  Skin: Good color, good turgor  Eyes: Extra ocular muscle movements intact, anicteric sclerae  Neck: Supple, full range of movement  Chest: Normal breath sounds, normal chest wall exam, symmetric, good air entry, clear to auscultation  Heart: Regular rate and rhythm, without murmur, gallop, or rubs  BACK:  no CTLS spine tenderness, no flank tenderness  Extremities: full range of movement  bilateral UE and bilateral LE,  no lower extremity edema  Neurological: Alert, oriented, cranial nerves II-XII grossly intact except for visual acuity  Sensation:  Intact   Gait: normal steady      Assessment/Plan   Abby Chisholm is a 48 y.o. female who presents for the concerns below:    Problem List Items Addressed This Visit    None  HYPERTENSION PLAN:  , taking blood pressure medication  Follow low salt diet, exercise as discussed, weight control. Continue current medications  Glaucoma now on additional eye drops seeing Dr Matthew  Procedure gynecology Dr Mejia on Wednesday   Ffup   HYPERCHOLESTEROLEMIA PLAN:  elevated not on medications  Follow low cholesterol diet, encouraged high omega 3 fatty acid intake in diet, exercise, weight control. . Will Obtain AST/ALT, fasting lipid profile if not done within past 3-6 months . Coenzyme Q10 200 mg a day if able to help increase HDL.         Discussed with:   Return in :  3 months    Portions of this note were generated using digital voice recognition software, and  may contain grammatical errors       Camila Ndiaye MD  03/18/24  10:46 AM

## 2024-03-19 ENCOUNTER — ANESTHESIA EVENT (OUTPATIENT)
Dept: OPERATING ROOM | Facility: HOSPITAL | Age: 49
End: 2024-03-19
Payer: COMMERCIAL

## 2024-03-20 ENCOUNTER — ANESTHESIA (OUTPATIENT)
Dept: OPERATING ROOM | Facility: HOSPITAL | Age: 49
End: 2024-03-20
Payer: COMMERCIAL

## 2024-03-20 ENCOUNTER — HOSPITAL ENCOUNTER (OUTPATIENT)
Facility: HOSPITAL | Age: 49
Setting detail: OUTPATIENT SURGERY
Discharge: HOME | End: 2024-03-20
Attending: OBSTETRICS & GYNECOLOGY | Admitting: OBSTETRICS & GYNECOLOGY
Payer: COMMERCIAL

## 2024-03-20 VITALS
HEART RATE: 56 BPM | BODY MASS INDEX: 42.54 KG/M2 | HEIGHT: 61 IN | DIASTOLIC BLOOD PRESSURE: 58 MMHG | TEMPERATURE: 97.7 F | RESPIRATION RATE: 16 BRPM | OXYGEN SATURATION: 96 % | WEIGHT: 225.31 LBS | SYSTOLIC BLOOD PRESSURE: 124 MMHG

## 2024-03-20 DIAGNOSIS — N93.9 ABNORMAL UTERINE BLEEDING (AUB): Primary | ICD-10-CM

## 2024-03-20 DIAGNOSIS — Z98.890 S/P ENDOMETRIAL ABLATION: ICD-10-CM

## 2024-03-20 DIAGNOSIS — D25.9 UTERINE LEIOMYOMA, UNSPECIFIED LOCATION: ICD-10-CM

## 2024-03-20 PROBLEM — R11.2 PONV (POSTOPERATIVE NAUSEA AND VOMITING): Status: ACTIVE | Noted: 2024-03-20

## 2024-03-20 LAB — PREGNANCY TEST URINE, POC: NEGATIVE

## 2024-03-20 PROCEDURE — 58563 HYSTEROSCOPY ABLATION: CPT | Performed by: OBSTETRICS & GYNECOLOGY

## 2024-03-20 PROCEDURE — 7100000009 HC PHASE TWO TIME - INITIAL BASE CHARGE: Performed by: OBSTETRICS & GYNECOLOGY

## 2024-03-20 PROCEDURE — 3600000008 HC OR TIME - EACH INCREMENTAL 1 MINUTE - PROCEDURE LEVEL THREE: Performed by: OBSTETRICS & GYNECOLOGY

## 2024-03-20 PROCEDURE — 2500000005 HC RX 250 GENERAL PHARMACY W/O HCPCS: Performed by: NURSE ANESTHETIST, CERTIFIED REGISTERED

## 2024-03-20 PROCEDURE — 7100000001 HC RECOVERY ROOM TIME - INITIAL BASE CHARGE: Performed by: OBSTETRICS & GYNECOLOGY

## 2024-03-20 PROCEDURE — 3600000003 HC OR TIME - INITIAL BASE CHARGE - PROCEDURE LEVEL THREE: Performed by: OBSTETRICS & GYNECOLOGY

## 2024-03-20 PROCEDURE — 2720000007 HC OR 272 NO HCPCS: Performed by: OBSTETRICS & GYNECOLOGY

## 2024-03-20 PROCEDURE — 3700000002 HC GENERAL ANESTHESIA TIME - EACH INCREMENTAL 1 MINUTE: Performed by: OBSTETRICS & GYNECOLOGY

## 2024-03-20 PROCEDURE — 3700000001 HC GENERAL ANESTHESIA TIME - INITIAL BASE CHARGE: Performed by: OBSTETRICS & GYNECOLOGY

## 2024-03-20 PROCEDURE — A58563 PR HYSTEROSCOPY,W/ENDOMETRIAL ABLATION: Performed by: NURSE ANESTHETIST, CERTIFIED REGISTERED

## 2024-03-20 PROCEDURE — 2500000004 HC RX 250 GENERAL PHARMACY W/ HCPCS (ALT 636 FOR OP/ED): Performed by: ANESTHESIOLOGY

## 2024-03-20 PROCEDURE — 2500000004 HC RX 250 GENERAL PHARMACY W/ HCPCS (ALT 636 FOR OP/ED): Performed by: NURSE ANESTHETIST, CERTIFIED REGISTERED

## 2024-03-20 PROCEDURE — 81025 URINE PREGNANCY TEST: CPT | Performed by: ANESTHESIOLOGY

## 2024-03-20 PROCEDURE — 7100000002 HC RECOVERY ROOM TIME - EACH INCREMENTAL 1 MINUTE: Performed by: OBSTETRICS & GYNECOLOGY

## 2024-03-20 PROCEDURE — 7100000010 HC PHASE TWO TIME - EACH INCREMENTAL 1 MINUTE: Performed by: OBSTETRICS & GYNECOLOGY

## 2024-03-20 RX ORDER — MIDAZOLAM HYDROCHLORIDE 1 MG/ML
INJECTION INTRAMUSCULAR; INTRAVENOUS AS NEEDED
Status: DISCONTINUED | OUTPATIENT
Start: 2024-03-20 | End: 2024-03-20

## 2024-03-20 RX ORDER — SCOLOPAMINE TRANSDERMAL SYSTEM 1 MG/1
1 PATCH, EXTENDED RELEASE TRANSDERMAL
Status: DISCONTINUED | OUTPATIENT
Start: 2024-03-20 | End: 2024-03-20 | Stop reason: HOSPADM

## 2024-03-20 RX ORDER — LIDOCAINE HCL/PF 100 MG/5ML
SYRINGE (ML) INTRAVENOUS AS NEEDED
Status: DISCONTINUED | OUTPATIENT
Start: 2024-03-20 | End: 2024-03-20

## 2024-03-20 RX ORDER — KETOROLAC TROMETHAMINE 30 MG/ML
INJECTION, SOLUTION INTRAMUSCULAR; INTRAVENOUS AS NEEDED
Status: DISCONTINUED | OUTPATIENT
Start: 2024-03-20 | End: 2024-03-20

## 2024-03-20 RX ORDER — PROPOFOL 10 MG/ML
INJECTION, EMULSION INTRAVENOUS AS NEEDED
Status: DISCONTINUED | OUTPATIENT
Start: 2024-03-20 | End: 2024-03-20

## 2024-03-20 RX ORDER — IBUPROFEN 200 MG
600 TABLET ORAL EVERY 6 HOURS
COMMUNITY
Start: 2024-03-20

## 2024-03-20 RX ORDER — ONDANSETRON HYDROCHLORIDE 2 MG/ML
INJECTION, SOLUTION INTRAVENOUS AS NEEDED
Status: DISCONTINUED | OUTPATIENT
Start: 2024-03-20 | End: 2024-03-20

## 2024-03-20 RX ORDER — ONDANSETRON HYDROCHLORIDE 2 MG/ML
8 INJECTION, SOLUTION INTRAVENOUS ONCE
Status: COMPLETED | OUTPATIENT
Start: 2024-03-20 | End: 2024-03-20

## 2024-03-20 RX ORDER — SODIUM CHLORIDE, SODIUM LACTATE, POTASSIUM CHLORIDE, CALCIUM CHLORIDE 600; 310; 30; 20 MG/100ML; MG/100ML; MG/100ML; MG/100ML
100 INJECTION, SOLUTION INTRAVENOUS CONTINUOUS
Status: DISCONTINUED | OUTPATIENT
Start: 2024-03-20 | End: 2024-03-20 | Stop reason: HOSPADM

## 2024-03-20 RX ORDER — ALBUTEROL SULFATE 0.83 MG/ML
2.5 SOLUTION RESPIRATORY (INHALATION) ONCE AS NEEDED
Status: DISCONTINUED | OUTPATIENT
Start: 2024-03-20 | End: 2024-03-20 | Stop reason: HOSPADM

## 2024-03-20 RX ORDER — ACETAMINOPHEN 325 MG/1
650 TABLET ORAL EVERY 4 HOURS PRN
Status: DISCONTINUED | OUTPATIENT
Start: 2024-03-20 | End: 2024-03-20 | Stop reason: HOSPADM

## 2024-03-20 RX ORDER — FENTANYL CITRATE 50 UG/ML
INJECTION, SOLUTION INTRAMUSCULAR; INTRAVENOUS AS NEEDED
Status: DISCONTINUED | OUTPATIENT
Start: 2024-03-20 | End: 2024-03-20

## 2024-03-20 RX ORDER — PROPOFOL 10 MG/ML
INJECTION, EMULSION INTRAVENOUS CONTINUOUS PRN
Status: DISCONTINUED | OUTPATIENT
Start: 2024-03-20 | End: 2024-03-20

## 2024-03-20 RX ADMIN — PROMETHAZINE HYDROCHLORIDE 6.25 MG: 25 INJECTION INTRAMUSCULAR; INTRAVENOUS at 09:19

## 2024-03-20 RX ADMIN — FENTANYL CITRATE 25 MCG: 50 INJECTION, SOLUTION INTRAMUSCULAR; INTRAVENOUS at 07:50

## 2024-03-20 RX ADMIN — PROPOFOL 150 MG: 10 INJECTION, EMULSION INTRAVENOUS at 07:37

## 2024-03-20 RX ADMIN — HYDROMORPHONE HYDROCHLORIDE 0.5 MG: 1 INJECTION, SOLUTION INTRAMUSCULAR; INTRAVENOUS; SUBCUTANEOUS at 09:03

## 2024-03-20 RX ADMIN — SODIUM CHLORIDE, POTASSIUM CHLORIDE, SODIUM LACTATE AND CALCIUM CHLORIDE 100 ML/HR: 600; 310; 30; 20 INJECTION, SOLUTION INTRAVENOUS at 06:34

## 2024-03-20 RX ADMIN — LIDOCAINE HYDROCHLORIDE 50 MG: 20 INJECTION INTRAVENOUS at 07:37

## 2024-03-20 RX ADMIN — ONDANSETRON 4 MG: 2 INJECTION INTRAMUSCULAR; INTRAVENOUS at 09:03

## 2024-03-20 RX ADMIN — DEXAMETHASONE SODIUM PHOSPHATE 8 MG: 4 INJECTION INTRA-ARTICULAR; INTRALESIONAL; INTRAMUSCULAR; INTRAVENOUS; SOFT TISSUE at 08:03

## 2024-03-20 RX ADMIN — FENTANYL CITRATE 25 MCG: 50 INJECTION, SOLUTION INTRAMUSCULAR; INTRAVENOUS at 07:53

## 2024-03-20 RX ADMIN — FENTANYL CITRATE 25 MCG: 50 INJECTION, SOLUTION INTRAMUSCULAR; INTRAVENOUS at 07:43

## 2024-03-20 RX ADMIN — PROPOFOL 100 MCG/KG/MIN: 10 INJECTION, EMULSION INTRAVENOUS at 07:37

## 2024-03-20 RX ADMIN — MIDAZOLAM HYDROCHLORIDE 2 MG: 1 INJECTION, SOLUTION INTRAMUSCULAR; INTRAVENOUS at 07:33

## 2024-03-20 RX ADMIN — SCOPOLAMINE 1 PATCH: 1.5 PATCH, EXTENDED RELEASE TRANSDERMAL at 07:27

## 2024-03-20 RX ADMIN — ONDANSETRON 4 MG: 2 INJECTION INTRAMUSCULAR; INTRAVENOUS at 08:15

## 2024-03-20 RX ADMIN — KETOROLAC TROMETHAMINE 30 MG: 30 INJECTION INTRAMUSCULAR; INTRAVENOUS at 08:16

## 2024-03-20 RX ADMIN — FENTANYL CITRATE 25 MCG: 50 INJECTION, SOLUTION INTRAMUSCULAR; INTRAVENOUS at 08:09

## 2024-03-20 SDOH — HEALTH STABILITY: MENTAL HEALTH: CURRENT SMOKER: 0

## 2024-03-20 ASSESSMENT — PAIN - FUNCTIONAL ASSESSMENT
PAIN_FUNCTIONAL_ASSESSMENT: 0-10

## 2024-03-20 ASSESSMENT — PAIN SCALES - GENERAL
PAINLEVEL_OUTOF10: 0 - NO PAIN
PAINLEVEL_OUTOF10: 3
PAINLEVEL_OUTOF10: 4
PAINLEVEL_OUTOF10: 2
PAINLEVEL_OUTOF10: 3
PAINLEVEL_OUTOF10: 0 - NO PAIN
PAINLEVEL_OUTOF10: 8

## 2024-03-20 ASSESSMENT — PAIN DESCRIPTION - LOCATION: LOCATION: ABDOMEN

## 2024-03-20 NOTE — ANESTHESIA POSTPROCEDURE EVALUATION
Patient: Abby Chisholm    Procedure Summary       Date: 03/20/24 Room / Location: [facility] / [facility]    Anesthesia Start: 0733 Anesthesia Stop: 0837    Procedure: HYSTEROSCOPY WITH ABLATION TISSUE Diagnosis:       Abnormal uterine bleeding (AUB)      Uterine leiomyoma, unspecified location      (Abnormal uterine bleeding (AUB) [N93.9])      (Uterine leiomyoma, unspecified location [D25.9])    Surgeons: Kaye Mejia MD Responsible Provider: JADE Raman    Anesthesia Type: general ASA Status: 3            Anesthesia Type: general    Vitals Value Taken Time   /65 03/20/24 0935   Temp 36.5 °C (97.7 °F) 03/20/24 0830   Pulse 56 03/20/24 0935   Resp 16 03/20/24 0935   SpO2 96 % 03/20/24 0935       Anesthesia Post Evaluation    Patient location during evaluation: PACU  Patient participation: complete - patient participated  Level of consciousness: awake and alert  Pain management: adequate  Airway patency: patent  Cardiovascular status: acceptable and blood pressure returned to baseline  Respiratory status: acceptable  Hydration status: acceptable  Postoperative Nausea and Vomiting: mild (Zofran and phenergan)        No notable events documented.

## 2024-03-20 NOTE — ANESTHESIA PREPROCEDURE EVALUATION
Patient: Abby Chisholm    Procedure Information       Date/Time: 03/20/24 0730    Procedure: HYSTEROSCOPY WITH ABLATION TISSUE - HTA    Location: [facility] / [facility]    Surgeons: Kaye Mejia MD            Relevant Problems   Anesthesia   (+) PONV (postoperative nausea and vomiting)      Cardiovascular   (+) Benign hypertension      Endocrine   (+) Obesity      GI   (+) Esophageal reflux (Well controlled)      /Renal (within normal limits)      Neuro/Psych   (+) Anxiety      Musculoskeletal   (+) Primary osteoarthritis of left knee      Infectious Disease   (+) Bacterial vaginitis   (+) COVID-19 virus infection     Vitals:    03/20/24 0609   BP: 151/86   Pulse: 74   Resp: 16   Temp: 36.7 °C (98.1 °F)   SpO2: 98%       Past Surgical History:   Procedure Laterality Date    APPENDECTOMY  09/08/2014    Appendectomy    DILATION AND CURETTAGE OF UTERUS  09/08/2014    Dilation And Curettage    MOUTH SURGERY  09/08/2014    Oral Surgery Tooth Extraction    OTHER SURGICAL HISTORY  11/29/2018    Knee surgery     Past Medical History:   Diagnosis Date    Abnormal uterine bleeding     Acute pharyngitis, unspecified 01/14/2022    Sore throat    Anxiety disorder, unspecified 07/28/2021    Anxiety    Arthritis     left knee arthritis    Class 3 severe obesity with body mass index (BMI) of 40.0 to 44.9 in adult (CMS/[facility]) 03/08/2024    42.83 kg/m²    Contusion of other part of head, initial encounter 10/24/2017    Forehead contusion    Cough, unspecified 01/14/2022    Cough    COVID-19     COVID-19 virus infection    Dorsalgia, unspecified 05/13/2016    Back pain    Edema, unspecified 11/22/2017    Edema    Elevated blood-pressure reading, without diagnosis of hypertension 02/21/2022    Blood pressure elevated without history of HTN    Encounter for immunization 09/21/2020    Need for tetanus booster    Encounter for immunization 09/24/2021    Flu vaccine need    Encounter for other screening for malignant neoplasm of  breast 12/16/2022    Breast screening    Encounter for screening for malignant neoplasm of colon 10/08/2021    Colon cancer screening    Epigastric pain 03/31/2016    Epigastric pain    Essential (primary) hypertension 07/15/2022    Benign hypertension    GERD (gastroesophageal reflux disease)     Hypertension     Hypokalemia 05/13/2016    Hypokalemia    Nasal congestion 03/21/2022    Sinus congestion    Other abnormal and inconclusive findings on diagnostic imaging of breast 10/12/2020    Abnormal mammogram    Other fatigue 12/17/2021    Fatigue    Other lesions of oral mucosa 07/19/2016    Mouth sore    Other specified disorders of nose and nasal sinuses 01/14/2022    Stuffy and runny nose    Ovarian cyst     Ovarian cyst    Overweight 10/18/2019    Overweight    Pain in left arm 02/21/2022    Pain of left upper extremity    Pain in right toe(s) 09/24/2021    Toe pain, right    Pain, unspecified 02/21/2022    Aches    Somnolence 12/17/2021    Daytime sleepiness    Unspecified abdominal pain 03/31/2016    Abdominal pain    Unspecified fall, initial encounter 10/24/2017    Fall at home    Uterine leiomyoma     Viral intestinal infection, unspecified 04/20/2022    Viral gastroenteritis    Vitamin D deficiency, unspecified 03/21/2022    Vitamin D deficiency       Current Facility-Administered Medications:     lactated Ringer's infusion, 100 mL/hr, intravenous, Continuous, Brain Epps MD, Last Rate: 100 mL/hr at 03/20/24 0634, 100 mL/hr at 03/20/24 0634  Prior to Admission medications    Medication Sig Start Date End Date Taking? Authorizing Provider   hydroCHLOROthiazide (Microzide) 12.5 mg capsule Take 1 capsule (12.5 mg) by mouth once every 24 hours. 3/18/24  Yes Camila Ndiaye MD   latanoprost 0.005 % drops, emulsion  1/5/24  Yes Historical Provider, MD   metFORMIN (Glucophage) 500 mg tablet TAKE 1 TABLET BY MOUTH TWICE A DAY WITH MEALS 1/24/24  Yes Camila Ndiaye MD    norethindrone (Micronor) 0.35 mg tablet TAKE 1 TABLET BY MOUTH EVERY DAY 2/26/24  Yes Kaye Mejia MD   azelastine (Astelin) 137 mcg (0.1 %) nasal spray Administer 1 spray into each nostril 2 times a day. Use in each nostril as directed 3/18/24 3/18/25  Camila Ndiaye MD   hydroCHLOROthiazide (Microzide) 12.5 mg capsule Take 1 capsule (12.5 mg) by mouth once every 24 hours. 12/18/23 3/18/24  Camila Ndiaye MD     Allergies   Allergen Reactions    Latex, Natural Rubber Rash and Shortness of breath    Tree Nuts Anaphylaxis    Hydrocodone-Acetaminophen Hives    Levaquin [Levofloxacin] Swelling and Dry mouth     Social History     Tobacco Use    Smoking status: Never    Smokeless tobacco: Never   Substance Use Topics    Alcohol use: Yes     Alcohol/week: 2.0 standard drinks of alcohol     Types: 2 Glasses of wine per week         Chemistry    Lab Results   Component Value Date/Time     03/09/2024 0916    K 4.3 03/09/2024 0916     03/09/2024 0916    CO2 29 03/09/2024 0916    BUN 14 03/09/2024 0916    CREATININE 0.69 03/09/2024 0916    Lab Results   Component Value Date/Time    CALCIUM 8.7 03/09/2024 0916    ALKPHOS 60 03/09/2024 0916    AST 24 03/09/2024 0916    ALT 44 03/09/2024 0916    BILITOT 0.5 03/09/2024 0916          Lab Results   Component Value Date/Time    WBC 8.5 12/26/2023 1038    HGB 13.7 12/26/2023 1038    HCT 41.6 12/26/2023 1038     12/26/2023 1038       Clinical information reviewed:   Tobacco  Allergies  Meds   Med Hx  Surg Hx  OB Status  Fam Hx  Soc   Hx        NPO Detail:  NPO/Void Status  Date of Last Solid: 03/19/24  Time of Last Solid: 2100  Last Intake Type: Food         Physical Exam    Airway  Mallampati: II  TM distance: >3 FB  Neck ROM: full     Cardiovascular   Rhythm: regular     Dental   Comments: Denies   Pulmonary   Breath sounds clear to auscultation     Abdominal            Anesthesia Plan    History of general anesthesia?:  yes  History of complications of general anesthesia?: yes    ASA 3     general     The patient is not a current smoker.    intravenous induction   Postoperative administration of opioids is intended.  Trial extubation is planned.  Anesthetic plan and risks discussed with patient.  Use of blood products discussed with patient who consented to blood products.    Plan discussed with CRNA.

## 2024-03-20 NOTE — H&P
Assessment/Plan     Principal Problem:    Abnormal uterine bleeding (AUB)  Active Problems:    Uterine leiomyoma    PONV (postoperative nausea and vomiting)    For hysteroscopy, possible D&C, hydrothermal endometrial ablation.    History Of Present Illness  Abby Chisholm is a 48 y.o. female presenting with abnormal uterine bleeding for hysteroscopy dilation and curettage, hydrothermal endometrial ablation.    H/O endometrial ablation and salpingectomy in 07/2020.  2013 myosure resection of submucosal fibroid.  Continued to get regular cycles, every 24 days, lasting 7 days.   Seen for episode of AUB in 12/2022. Normal labs including CBC, TSH, prolactin. Pelvic US 01/02/2023 showed uterine fibroids, 1-3 mm, endometrium 8 mm.  Wanted to try birth control pills to see if this helped. Given h/o HTN, she was started on POPs and has been on them since.   She was doing okay with this then started having bleeding on 08/2023 and had daily heavy irregular bleeding during August.   She is a non-smoker.   Medical history significant for anxiety, arthritis, hypertension.     Past Medical History  Past Medical History:   Diagnosis Date    Abnormal uterine bleeding     Acute pharyngitis, unspecified 01/14/2022    Sore throat    Anxiety disorder, unspecified 07/28/2021    Anxiety    Arthritis     left knee arthritis    Class 3 severe obesity with body mass index (BMI) of 40.0 to 44.9 in adult (CMS/[facility]) 03/08/2024    42.83 kg/m²    Contusion of other part of head, initial encounter 10/24/2017    Forehead contusion    Cough, unspecified 01/14/2022    Cough    COVID-19     COVID-19 virus infection    Dorsalgia, unspecified 05/13/2016    Back pain    Edema, unspecified 11/22/2017    Edema    Elevated blood-pressure reading, without diagnosis of hypertension 02/21/2022    Blood pressure elevated without history of HTN    Encounter for immunization 09/21/2020    Need for tetanus booster    Encounter for immunization 09/24/2021    Flu  vaccine need    Encounter for other screening for malignant neoplasm of breast 12/16/2022    Breast screening    Encounter for screening for malignant neoplasm of colon 10/08/2021    Colon cancer screening    Epigastric pain 03/31/2016    Epigastric pain    Essential (primary) hypertension 07/15/2022    Benign hypertension    GERD (gastroesophageal reflux disease)     Hypertension     Hypokalemia 05/13/2016    Hypokalemia    Nasal congestion 03/21/2022    Sinus congestion    Other abnormal and inconclusive findings on diagnostic imaging of breast 10/12/2020    Abnormal mammogram    Other fatigue 12/17/2021    Fatigue    Other lesions of oral mucosa 07/19/2016    Mouth sore    Other specified disorders of nose and nasal sinuses 01/14/2022    Stuffy and runny nose    Ovarian cyst     Ovarian cyst    Overweight 10/18/2019    Overweight    Pain in left arm 02/21/2022    Pain of left upper extremity    Pain in right toe(s) 09/24/2021    Toe pain, right    Pain, unspecified 02/21/2022    Aches    Somnolence 12/17/2021    Daytime sleepiness    Unspecified abdominal pain 03/31/2016    Abdominal pain    Unspecified fall, initial encounter 10/24/2017    Fall at home    Uterine leiomyoma     Viral intestinal infection, unspecified 04/20/2022    Viral gastroenteritis    Vitamin D deficiency, unspecified 03/21/2022    Vitamin D deficiency       Surgical History  Past Surgical History:   Procedure Laterality Date    APPENDECTOMY  09/08/2014    Appendectomy    DILATION AND CURETTAGE OF UTERUS  09/08/2014    Dilation And Curettage    MOUTH SURGERY  09/08/2014    Oral Surgery Tooth Extraction    OTHER SURGICAL HISTORY  11/29/2018    Knee surgery        Social History  She reports that she has never smoked. She has never used smokeless tobacco. She reports current alcohol use of about 2.0 standard drinks of alcohol per week. She reports that she does not use drugs.    Family History  Family History   Problem Relation Name Age of  "Onset    Thyroid cancer Mother      Heart disease Father      Other (eating do) Sister      Breast cancer Paternal Grandmother  70        Allergies  Latex, natural rubber; Tree nuts; Hydrocodone-acetaminophen; and Levaquin [levofloxacin]       Physical Exam     Last Recorded Vitals  Blood pressure 151/86, pulse 74, temperature 36.7 °C (98.1 °F), resp. rate 16, height 1.549 m (5' 1\"), weight 102 kg (225 lb 5 oz), SpO2 98 %.    Physical Exam     Constitutional: Alert and in no acute distress. Well developed, well nourished   Head and Face: Head and face: normal   Cardiovascular: Heart rate and rhythm were normal, normal S1 and S2, no gallops, and no murmurs   Pulmonary: No respiratory distress and clear bilateral breath sounds   Abdomen: soft nontender; no abdominal mass palpated, no organomegaly and no hernias   Skin: normal skin color and pigmentation, normal skin turgor, and no rash.   Psychiatric: alert and oriented x 3., affect normal to patient baseline and mood: appropriate          Kaye Mejia MD    "

## 2024-03-20 NOTE — ANESTHESIA PROCEDURE NOTES
Airway  Date/Time: 3/20/2024 7:38 AM  Urgency: elective    Airway not difficult    Staffing  Performed: CRNA   Authorized by: JOHAN Raman-CRNA    Performed by: JOHAN Raman-ELISABET  Patient location during procedure: OR    Indications and Patient Condition  Indications for airway management: anesthesia  Spontaneous Ventilation: absent  Sedation level: deep  Preoxygenated: yes  Mask difficulty assessment: 0 - not attempted    Final Airway Details  Final airway type: supraglottic airway      Successful airway: Supraglottic airway: iGel.  Size 4     Number of attempts at approach: 1

## 2024-03-20 NOTE — OP NOTE
Date: 3/20/2024  OR Location: [facility] OR    Name: Abby Chisholm, : 1975, Age: 48 y.o., MRN: [id number], Sex: female    Diagnosis  Pre-op Diagnosis     * Abnormal uterine bleeding (AUB) [N93.9]     * Uterine leiomyoma, unspecified location [D25.9] Post-op Diagnosis     * Abnormal uterine bleeding (AUB) [N93.9]     * Uterine leiomyoma, unspecified location [D25.9]     Procedures  HYSTEROSCOPY WITH ABLATION TISSUE  79303 - RI HYSTEROSCOPY ENDOMETRIAL ABLATION      Surgeons      * Kaye Mejia - Primary    Resident/Fellow/Other Assistant:  Surgeon(s) and Role:    Procedure Summary  Anesthesia: Consult  ASA: III  Anesthesia Staff: CRNA: JOHAN Raman-CRNA  Estimated Blood Loss: 5 mL  Intra-op Medications:   Administrations occurring from 0730 to 0830 on 24:   Medication Name Total Dose   lactated Ringer's infusion 223.33 mL              Anesthesia Record               Intraprocedure I/O Totals          Intake    Propofol Drip 0.00 mL    The total shown is the total volume documented since Anesthesia Start was filed.    lactated Ringer's infusion 338.33 mL    Total Intake 338.33 mL          Specimen: No specimens collected     Staff:   Circulator: Lenin Gray RN  Scrub Person: Garrett Alcala         Procedure Details:  The patient was seen in the preoperative area. The site of surgery was properly noted/marked if necessary per policy. The patient has been actively warmed in preoperative area. Preoperative antibiotics are not indicated. Venous thrombosis prophylaxis, SCD's placed on lower extremities.    Findings: Normal appearing uterine cavity.     Pt taken to the operating room, general anesthesia provided.  Prepped and draped in the dorsal lithotomy position, low Stephen stirrups.  Speculum placed in the vagina.  Single-tooth tenaculum placed on the anterior lip of the cervix.  Cervix grossly normal appearing.  Uterine cervix dilated until the HTA hysteroscope could gently be advanced through the  cervical os into the endometrial cavity.  Saline used as a visualization medium.  Normal-appearing uterine cavity without evidence of polyps or fibroids.  The cavity appeared atrophic.  The HTA equipment was positioned just inside the internal cervical os.  Cervical seal test was initially not passed and a purse string suture was placed around the cervix.  The cervical seal test was then passed without evidence of leakage.  The HTA equipment was activated and a 10-minute ablation at 90 °C was performed.  1/92 cooldown was allowed.  The procedure was performed under direct visualization.  The equipment was removed.  Suture was cut from around the cervix.  Patient was awakened from general anesthesia and returned to recovery in good condition.  All sponge tape needle instrument counts reported correct at the end of the procedure.    Complications:  None; patient tolerated the procedure well.     Disposition: PACU - hemodynamically stable.  Condition: stable    Kaye Mejia MD

## 2024-03-28 ENCOUNTER — APPOINTMENT (OUTPATIENT)
Dept: OPHTHALMOLOGY | Facility: CLINIC | Age: 49
End: 2024-03-28
Payer: COMMERCIAL

## 2024-04-02 ENCOUNTER — OFFICE VISIT (OUTPATIENT)
Dept: OBSTETRICS AND GYNECOLOGY | Facility: CLINIC | Age: 49
End: 2024-04-02
Payer: COMMERCIAL

## 2024-04-02 VITALS — SYSTOLIC BLOOD PRESSURE: 122 MMHG | WEIGHT: 231 LBS | BODY MASS INDEX: 43.65 KG/M2 | DIASTOLIC BLOOD PRESSURE: 86 MMHG

## 2024-04-02 DIAGNOSIS — Z48.89 ENCOUNTER FOR POSTOPERATIVE CARE: Primary | ICD-10-CM

## 2024-04-02 PROCEDURE — 1036F TOBACCO NON-USER: CPT | Performed by: OBSTETRICS & GYNECOLOGY

## 2024-04-02 PROCEDURE — 99024 POSTOP FOLLOW-UP VISIT: CPT | Performed by: OBSTETRICS & GYNECOLOGY

## 2024-04-02 PROCEDURE — 3079F DIAST BP 80-89 MM HG: CPT | Performed by: OBSTETRICS & GYNECOLOGY

## 2024-04-02 PROCEDURE — 3074F SYST BP LT 130 MM HG: CPT | Performed by: OBSTETRICS & GYNECOLOGY

## 2024-04-02 NOTE — PROGRESS NOTES
ASSESSMENT/PLAN    Post op hydrothermal endometrial ablation.   May resume all preop activities.   Discharge will become less.  Continue progesterone only pill for one month.   Okay to resume RA visits with JUANITA Dalal    SUBJECTIVE    HPI    47 yo s/p hydrothermal endometrial ablation 3/20/2024 for abnormal uterine bleeding.   No pain. Having beige watery discharge.   Continues on progesterone only OC. Will plan to continue for one month.     OBJECTIVE    /86   Wt 105 kg (231 lb)   BMI 43.65 kg/m²     Physical Exam     Constitutional: Alert and in no acute distress. Well developed, well nourished   Genitourinary: external genitalia: normal  Vagina: normal, scant beige colored discharge.  Cervix: Normal appearing   Uterus: Normal, mobile, nontender.  Right Adnexa/parametria: Normal.   Left Adnexa/parametria: Normal.   Psychiatric: alert and oriented x 3., affect normal to patient baseline and mood: appropriate       Kaye Mejia MD

## 2024-04-10 ENCOUNTER — APPOINTMENT (OUTPATIENT)
Dept: OPHTHALMOLOGY | Facility: CLINIC | Age: 49
End: 2024-04-10
Payer: COMMERCIAL

## 2024-04-11 ENCOUNTER — OFFICE VISIT (OUTPATIENT)
Dept: OPHTHALMOLOGY | Facility: CLINIC | Age: 49
End: 2024-04-11
Payer: COMMERCIAL

## 2024-04-11 DIAGNOSIS — H40.001 GLAUCOMA SUSPECT OF RIGHT EYE: ICD-10-CM

## 2024-04-11 DIAGNOSIS — H40.002 GLAUCOMA SUSPECT OF LEFT EYE: ICD-10-CM

## 2024-04-11 DIAGNOSIS — H40.053 BILATERAL OCULAR HYPERTENSION: ICD-10-CM

## 2024-04-11 DIAGNOSIS — H40.1130 PRIMARY OPEN ANGLE GLAUCOMA OF BOTH EYES, UNSPECIFIED GLAUCOMA STAGE: Primary | ICD-10-CM

## 2024-04-11 PROCEDURE — 99204 OFFICE O/P NEW MOD 45 MIN: CPT | Performed by: OPHTHALMOLOGY

## 2024-04-11 PROCEDURE — 76514 ECHO EXAM OF EYE THICKNESS: CPT | Performed by: OPHTHALMOLOGY

## 2024-04-11 PROCEDURE — 92020 GONIOSCOPY: CPT | Performed by: OPHTHALMOLOGY

## 2024-04-11 PROCEDURE — 92133 CPTRZD OPH DX IMG PST SGM ON: CPT | Performed by: OPHTHALMOLOGY

## 2024-04-11 PROCEDURE — 92083 EXTENDED VISUAL FIELD XM: CPT | Performed by: OPHTHALMOLOGY

## 2024-04-11 ASSESSMENT — PACHYMETRY
OS_CT(UM): 571
EXAM_DATE: 4/11/2024
OD_CT(UM): 585

## 2024-04-11 ASSESSMENT — REFRACTION_WEARINGRX
OD_AXIS: 070
OS_AXIS: 145
OD_SPHERE: -0.25
OS_CYLINDER: -1.00
OD_CYLINDER: -0.50
OS_SPHERE: PLANO

## 2024-04-11 ASSESSMENT — TONOMETRY
OD_IOP_MMHG: 19
IOP_METHOD: GOLDMANN APPLANATION
OS_IOP_MMHG: 19

## 2024-04-11 ASSESSMENT — VISUAL ACUITY
OS_CC: 20/25
OD_CC: 20/25
CORRECTION_TYPE: GLASSES
OS_CC+: -3
METHOD: SNELLEN - LINEAR
OD_CC+: -3

## 2024-04-11 ASSESSMENT — CONF VISUAL FIELD
METHOD: COUNTING FINGERS
OD_NORMAL: 1
OD_INFERIOR_TEMPORAL_RESTRICTION: 0
OS_SUPERIOR_NASAL_RESTRICTION: 0
OD_INFERIOR_NASAL_RESTRICTION: 0
OS_INFERIOR_NASAL_RESTRICTION: 0
OS_INFERIOR_TEMPORAL_RESTRICTION: 0
OD_SUPERIOR_NASAL_RESTRICTION: 0
OS_NORMAL: 1
OD_SUPERIOR_TEMPORAL_RESTRICTION: 0
OS_SUPERIOR_TEMPORAL_RESTRICTION: 0

## 2024-04-11 ASSESSMENT — GONIOSCOPY
OS_TEMPORAL: D45F 1-2+
OS_SUPERIOR: D45F 1-2+
OD_NASAL: D45F 1-2+
OD_INFERIOR: D45F 1-2+
OS_NASAL: D45F 1-2+
OD_TEMPORAL: D45F 1-2+
OS_INFERIOR: D45F 1-2+
OD_SUPERIOR: D45F 1-2+

## 2024-04-11 ASSESSMENT — CUP TO DISC RATIO
OD_RATIO: 0.35
OS_RATIO: 0.3

## 2024-04-11 ASSESSMENT — SLIT LAMP EXAM - LIDS
COMMENTS: NORMAL
COMMENTS: NORMAL

## 2024-04-11 ASSESSMENT — EXTERNAL EXAM - RIGHT EYE: OD_EXAM: NORMAL

## 2024-04-11 ASSESSMENT — EXTERNAL EXAM - LEFT EYE: OS_EXAM: NORMAL

## 2024-04-11 NOTE — PROGRESS NOTES
History    Chief Complaint    Glaucoma       HPI    NPV. 48y F. Hx Glaucoma suspect. Pt using Latanoprost OU qhs. Pt states no va complaints. Denies pain, irritation, redness. Occa floaters, headaches.   Last edited by Lucille Downey on 4/11/2024  3:00 PM.        Problem List  Date Reviewed: 4/11/2024      Elevated blood pressure reading    Anxiety    Bacterial vaginitis    Benign hypertension    Blood pressure elevated without history of HTN    Breast lump on left side at 10 o'clock position    Burn    Cough    Snoring    COVID-19 virus infection    Daytime sleepiness    Dysmetabolic syndrome    Edema    Epigastric pain    Esophageal reflux    Fall at home    Fatigue    Forehead contusion    Headache    Hypokalemia    Right knee pain    Left knee pain    Medial meniscus tear    Menorrhagia    Mouth sore    Obesity    Ovarian cyst    Overweight    Pain of left upper extremity    Primary osteoarthritis of left knee    Sinus congestion    Sore throat    Shoulder pain, right    Stuffy and runny nose    Abnormal uterine bleeding (AUB)    Uterine leiomyoma    PONV (postoperative nausea and vomiting)       Past Medical History:   Diagnosis Date    Abnormal uterine bleeding     Acute pharyngitis, unspecified 01/14/2022    Sore throat    Anxiety disorder, unspecified 07/28/2021    Anxiety    Arthritis     left knee arthritis    Class 3 severe obesity with body mass index (BMI) of 40.0 to 44.9 in adult (CMS/[facility]) 03/08/2024    42.83 kg/m²    Contusion of other part of head, initial encounter 10/24/2017    Forehead contusion    Cough, unspecified 01/14/2022    Cough    COVID-19     COVID-19 virus infection    Dorsalgia, unspecified 05/13/2016    Back pain    Edema, unspecified 11/22/2017    Edema    Elevated blood-pressure reading, without diagnosis of hypertension 02/21/2022    Blood pressure elevated without history of HTN    Encounter for immunization 09/21/2020    Need for tetanus booster    Encounter for immunization  09/24/2021    Flu vaccine need    Encounter for other screening for malignant neoplasm of breast 12/16/2022    Breast screening    Encounter for screening for malignant neoplasm of colon 10/08/2021    Colon cancer screening    Epigastric pain 03/31/2016    Epigastric pain    Essential (primary) hypertension 07/15/2022    Benign hypertension    GERD (gastroesophageal reflux disease)     Hypertension     Hypokalemia 05/13/2016    Hypokalemia    Nasal congestion 03/21/2022    Sinus congestion    Other abnormal and inconclusive findings on diagnostic imaging of breast 10/12/2020    Abnormal mammogram    Other fatigue 12/17/2021    Fatigue    Other lesions of oral mucosa 07/19/2016    Mouth sore    Other specified disorders of nose and nasal sinuses 01/14/2022    Stuffy and runny nose    Ovarian cyst     Ovarian cyst    Overweight 10/18/2019    Overweight    Pain in left arm 02/21/2022    Pain of left upper extremity    Pain in right toe(s) 09/24/2021    Toe pain, right    Pain, unspecified 02/21/2022    Aches    Somnolence 12/17/2021    Daytime sleepiness    Unspecified abdominal pain 03/31/2016    Abdominal pain    Unspecified fall, initial encounter 10/24/2017    Fall at home    Uterine leiomyoma     Viral intestinal infection, unspecified 04/20/2022    Viral gastroenteritis    Vitamin D deficiency, unspecified 03/21/2022    Vitamin D deficiency     Past Surgical History:   Procedure Laterality Date    APPENDECTOMY  09/08/2014    Appendectomy    DILATION AND CURETTAGE OF UTERUS  09/08/2014    Dilation And Curettage    ENDOMETRIAL ABLATION N/A 03/20/2024    Hydrothermal endometrial ablation    MOUTH SURGERY  09/08/2014    Oral Surgery Tooth Extraction    OTHER SURGICAL HISTORY  11/29/2018    Knee surgery     SOCIAL HISTORY   SMOKING:  reports that she has never smoked. She has never used smokeless tobacco.  DRUG USE:    reports no history of drug use.    FAMILY HISTORY  family history includes Breast cancer (age of  onset: 70) in her paternal grandmother; Heart disease in her father; Thyroid cancer in her mother; eating do in her sister.    CURRENT MEDICATIONS  Current Outpatient Medications (Ophthalmology pharm classes)   Medication Sig Dispense Refill    latanoprost 0.005 % drops, emulsion        Current Outpatient Medications (Other)   Medication Sig Dispense Refill    azelastine (Astelin) 137 mcg (0.1 %) nasal spray Administer 1 spray into each nostril 2 times a day. Use in each nostril as directed 30 mL 12    hydroCHLOROthiazide (Microzide) 12.5 mg capsule Take 1 capsule (12.5 mg) by mouth once every 24 hours. 90 capsule 1    ibuprofen 200 mg tablet Take 3 tablets (600 mg) by mouth every 6 hours.      metFORMIN (Glucophage) 500 mg tablet TAKE 1 TABLET BY MOUTH TWICE A DAY WITH MEALS 60 tablet 2    norethindrone (Micronor) 0.35 mg tablet TAKE 1 TABLET BY MOUTH EVERY DAY 84 tablet 0       Exam   Visual Acuity (Snellen - Linear)         Right Left    Dist cc 20/25 -3 20/25 -3      Correction: Glasses              Edited by: Lucille Downey              Wearing Rx       Wearing Rx         Sphere Cylinder Axis    Right -0.25 -0.50 070    Left [place] -1.00 145                  Not recorded       Pupils       Pupils         Pupils    Right PERRL, No APD    Left PERRL, No APD                  Intraocular pressure was 19 in the right eye and 19 in the left eye using Goldmann Applanation.  Extraocular Movement       Extraocular Movement         Right Left     Full Full                  Pachymetry       Pachymetry (4/11/2024)         Right Left    Thickness 585 571                  Gonioscopy       Gonioscopy         Right Left    Temporal d45f 1-2+ d45f 1-2+    Nasal d45f 1-2+ d45f 1-2+    Superior d45f 1-2+ d45f 1-2+    Inferior d45f 1-2+ d45f 1-2+                   External Exam         Right Left    External Normal Normal              Slit Lamp Exam         Right Left    Lids/Lashes Normal Normal    Conjunctiva/Sclera White and quiet  "White and quiet    Cornea Clear Clear    Anterior Chamber Deep and quiet Deep and quiet    Iris Round and reactive Round and reactive    Lens Clear Clear    Anterior Vitreous Normal Normal              Fundus Exam         Right Left    Disc Normal Normal    C/D Ratio 0.35 0.3    Macula Normal Normal                   <div id=\"MAIN_EXAM_[email or web address]\"></div>       Diagnostics  Chaidez Visual Field - OU - Both Eyes          Limited reliability, no evidence of glaucoma         OCT, Optic Nerve - OU - Both Eyes          Right Eye  Images reviewed.     Left Eye  Images reviewed.     Notes  robust             Plan   -  The primary encounter diagnosis was Primary open angle glaucoma of both eyes, unspecified glaucoma stage. Diagnoses of Bilateral ocular hypertension, Glaucoma suspect of right eye, and Glaucoma suspect of left eye were also pertinent to this visit.  -  Referred By:  Delilah Matthew MD  -  IOP:  Last Tonometry OD 19 / OS 19 /Date 3:03 PM      Target OD: No Value exists for the : EPIC#ITF894 Target OS: No Value exists for the : EPIC#WOR322       Max pressure OD:   Date:         Max Pressure OS:   Date:    -    Gonioscopy       Gonioscopy         Right Left    Temporal d45f 1-2+ d45f 1-2+    Nasal d45f 1-2+ d45f 1-2+    Superior d45f 1-2+ d45f 1-2+    Inferior d45f 1-2+ d45f 1-2+                    -    Pachymetry       Pachymetry (4/11/2024)         Right Left    Thickness 585 571                  -  Pathophysiology of glaucoma, and potential blinding nature of disease reviewed.      Importance of follow up and compliance stressed  -ocular htn glaucoma suspect based on possible elevated IOP at outside provider  -testing today is reassuring, overall patient is currently low risk  -d/w patient options of d/c drop with close monitoring vs cpm  -she wishes to d/c drop  -rtc 3 months for IOP check      "

## 2024-04-26 ENCOUNTER — TELEPHONE (OUTPATIENT)
Dept: ORTHOPEDIC SURGERY | Facility: HOSPITAL | Age: 49
End: 2024-04-26
Payer: COMMERCIAL

## 2024-04-26 DIAGNOSIS — M17.12 PRIMARY OSTEOARTHRITIS OF LEFT KNEE: Primary | ICD-10-CM

## 2024-04-26 NOTE — TELEPHONE ENCOUNTER
Patient states cortisone injection from 3/5/24 is actually giving her some relief this time.  It is starting to wear off though.  She would like to try the gel injections this time.

## 2024-05-12 RX ORDER — HYALURONATE SODIUM, STABILIZED 60 MG/3 ML
60 SYRINGE (ML) INTRAARTICULAR ONCE
Qty: 3 ML | Refills: 0 | Status: SHIPPED | OUTPATIENT
Start: 2024-05-12 | End: 2024-05-12

## 2024-05-16 ENCOUNTER — PATIENT MESSAGE (OUTPATIENT)
Dept: PRIMARY CARE | Facility: CLINIC | Age: 49
End: 2024-05-16
Payer: COMMERCIAL

## 2024-05-16 DIAGNOSIS — R79.9 ABNORMAL BLOOD CHEMISTRY: ICD-10-CM

## 2024-05-16 RX ORDER — METFORMIN HYDROCHLORIDE 500 MG/1
500 TABLET ORAL
Qty: 60 TABLET | Refills: 2 | Status: SHIPPED | OUTPATIENT
Start: 2024-05-16

## 2024-05-21 DIAGNOSIS — N92.0 EXCESSIVE AND FREQUENT MENSTRUATION WITH REGULAR CYCLE: ICD-10-CM

## 2024-05-21 RX ORDER — NORETHINDRONE 0.35 MG/1
1 TABLET ORAL DAILY
Qty: 84 TABLET | Refills: 0 | Status: SHIPPED | OUTPATIENT
Start: 2024-05-21

## 2024-06-14 ENCOUNTER — LAB (OUTPATIENT)
Dept: LAB | Facility: LAB | Age: 49
End: 2024-06-14
Payer: COMMERCIAL

## 2024-06-14 DIAGNOSIS — E78.00 HYPERCHOLESTEROLEMIA: ICD-10-CM

## 2024-06-14 DIAGNOSIS — I10 BENIGN HYPERTENSION: ICD-10-CM

## 2024-06-14 DIAGNOSIS — R79.9 ABNORMAL BLOOD CHEMISTRY: ICD-10-CM

## 2024-06-14 LAB
ALT SERPL W P-5'-P-CCNC: 17 U/L (ref 7–45)
ANION GAP SERPL CALC-SCNC: 12 MMOL/L (ref 10–20)
BUN SERPL-MCNC: 15 MG/DL (ref 6–23)
CALCIUM SERPL-MCNC: 9 MG/DL (ref 8.6–10.3)
CHLORIDE SERPL-SCNC: 104 MMOL/L (ref 98–107)
CHOLEST SERPL-MCNC: 192 MG/DL (ref 0–199)
CHOLESTEROL/HDL RATIO: 3.2
CO2 SERPL-SCNC: 24 MMOL/L (ref 21–32)
CREAT SERPL-MCNC: 0.56 MG/DL (ref 0.5–1.05)
EGFRCR SERPLBLD CKD-EPI 2021: >90 ML/MIN/1.73M*2
EST. AVERAGE GLUCOSE BLD GHB EST-MCNC: 111 MG/DL
GLUCOSE SERPL-MCNC: 96 MG/DL (ref 74–99)
HBA1C MFR BLD: 5.5 %
HDLC SERPL-MCNC: 59.8 MG/DL
LDLC SERPL CALC-MCNC: 104 MG/DL
NON HDL CHOLESTEROL: 132 MG/DL (ref 0–149)
POTASSIUM SERPL-SCNC: 3.8 MMOL/L (ref 3.5–5.3)
SODIUM SERPL-SCNC: 136 MMOL/L (ref 136–145)
TRIGL SERPL-MCNC: 140 MG/DL (ref 0–149)
VLDL: 28 MG/DL (ref 0–40)

## 2024-06-14 PROCEDURE — 36415 COLL VENOUS BLD VENIPUNCTURE: CPT

## 2024-06-14 PROCEDURE — 83036 HEMOGLOBIN GLYCOSYLATED A1C: CPT

## 2024-06-17 ENCOUNTER — APPOINTMENT (OUTPATIENT)
Dept: PRIMARY CARE | Facility: CLINIC | Age: 49
End: 2024-06-17
Payer: COMMERCIAL

## 2024-06-17 VITALS
BODY MASS INDEX: 43.08 KG/M2 | DIASTOLIC BLOOD PRESSURE: 78 MMHG | RESPIRATION RATE: 16 BRPM | SYSTOLIC BLOOD PRESSURE: 130 MMHG | WEIGHT: 228 LBS

## 2024-06-17 DIAGNOSIS — E66.01 CLASS 3 SEVERE OBESITY WITH BODY MASS INDEX (BMI) OF 40.0 TO 44.9 IN ADULT, UNSPECIFIED OBESITY TYPE, UNSPECIFIED WHETHER SERIOUS COMORBIDITY PRESENT (MULTI): Primary | ICD-10-CM

## 2024-06-17 DIAGNOSIS — M25.562 PAIN IN BOTH KNEES, UNSPECIFIED CHRONICITY: ICD-10-CM

## 2024-06-17 DIAGNOSIS — I10 BENIGN HYPERTENSION: ICD-10-CM

## 2024-06-17 DIAGNOSIS — R60.0 BILATERAL LEG EDEMA: ICD-10-CM

## 2024-06-17 DIAGNOSIS — M25.561 PAIN IN BOTH KNEES, UNSPECIFIED CHRONICITY: ICD-10-CM

## 2024-06-17 PROCEDURE — 3008F BODY MASS INDEX DOCD: CPT | Performed by: INTERNAL MEDICINE

## 2024-06-17 PROCEDURE — 99214 OFFICE O/P EST MOD 30 MIN: CPT | Performed by: INTERNAL MEDICINE

## 2024-06-17 PROCEDURE — 1036F TOBACCO NON-USER: CPT | Performed by: INTERNAL MEDICINE

## 2024-06-17 PROCEDURE — 3078F DIAST BP <80 MM HG: CPT | Performed by: INTERNAL MEDICINE

## 2024-06-17 PROCEDURE — 3075F SYST BP GE 130 - 139MM HG: CPT | Performed by: INTERNAL MEDICINE

## 2024-06-17 RX ORDER — TOPIRAMATE 25 MG/1
25 TABLET ORAL 2 TIMES DAILY
Qty: 60 TABLET | Refills: 1 | Status: SHIPPED | OUTPATIENT
Start: 2024-06-17 | End: 2025-06-17

## 2024-06-17 RX ORDER — HYDROCHLOROTHIAZIDE 25 MG/1
25 TABLET ORAL DAILY
Qty: 30 TABLET | Refills: 2 | Status: SHIPPED | OUTPATIENT
Start: 2024-06-17 | End: 2024-09-15

## 2024-06-17 ASSESSMENT — ENCOUNTER SYMPTOMS
LOSS OF SENSATION IN FEET: 0
OCCASIONAL FEELINGS OF UNSTEADINESS: 0
DEPRESSION: 0

## 2024-06-17 ASSESSMENT — COLUMBIA-SUICIDE SEVERITY RATING SCALE - C-SSRS
2. HAVE YOU ACTUALLY HAD ANY THOUGHTS OF KILLING YOURSELF?: NO
1. IN THE PAST MONTH, HAVE YOU WISHED YOU WERE DEAD OR WISHED YOU COULD GO TO SLEEP AND NOT WAKE UP?: NO
6. HAVE YOU EVER DONE ANYTHING, STARTED TO DO ANYTHING, OR PREPARED TO DO ANYTHING TO END YOUR LIFE?: NO

## 2024-06-17 NOTE — PATIENT INSTRUCTIONS
Hold off on the increase in the water pill ,can try this just prior to your trip     But try the topamax start with one a day then go up to twice a day , hoping for both for weight and pain    Wear your compression socks in the plane

## 2024-06-17 NOTE — PROGRESS NOTES
Subjective   Patient ID: Abby Chisholm is a 48 y.o. female who presents for FUV.    HPI  Patient in for a visit  First time on Monday will get knee gel injections    They could not do too much with being overweight     Review of Systems  General: Denies fever, chills, night sweats,  Eyes: Negative for recent visual changes  Ears, Nose, Throat :  Negative for hearing changes, sinus discomfort  Dermatologic: Negative for new skin conditions, rash  Respiratory: Negative for wheezing, shortness of breath, cough  Cardiovascular: Negative for chest pain, palpitations, or leg swelling  Gastrointestinal: Negative for nausea/vomiting, abdominal pain, changes in bowel habits  Genitourinary Negative for Urinary Incontinence  urgency , frequency, discomfort   Musculoskeletal: see hpi  Neurological: Negative for headaches, dizziness    Previous history  Past Medical History:   Diagnosis Date    Abnormal uterine bleeding     Acute pharyngitis, unspecified 01/14/2022    Sore throat    Anxiety disorder, unspecified 07/28/2021    Anxiety    Arthritis     left knee arthritis    Class 3 severe obesity with body mass index (BMI) of 40.0 to 44.9 in adult ([facility]) 03/08/2024    42.83 kg/m²    Contusion of other part of head, initial encounter 10/24/2017    Forehead contusion    Cough, unspecified 01/14/2022    Cough    COVID-19     COVID-19 virus infection    Dorsalgia, unspecified 05/13/2016    Back pain    Edema, unspecified 11/22/2017    Edema    Elevated blood-pressure reading, without diagnosis of hypertension 02/21/2022    Blood pressure elevated without history of HTN    Encounter for immunization 09/21/2020    Need for tetanus booster    Encounter for immunization 09/24/2021    Flu vaccine need    Encounter for other screening for malignant neoplasm of breast 12/16/2022    Breast screening    Encounter for screening for malignant neoplasm of colon 10/08/2021    Colon cancer screening    Epigastric pain 03/31/2016     Epigastric pain    Essential (primary) hypertension 07/15/2022    Benign hypertension    GERD (gastroesophageal reflux disease)     Hypertension     Hypokalemia 05/13/2016    Hypokalemia    Nasal congestion 03/21/2022    Sinus congestion    Other abnormal and inconclusive findings on diagnostic imaging of breast 10/12/2020    Abnormal mammogram    Other fatigue 12/17/2021    Fatigue    Other lesions of oral mucosa 07/19/2016    Mouth sore    Other specified disorders of nose and nasal sinuses 01/14/2022    Stuffy and runny nose    Ovarian cyst     Ovarian cyst    Overweight 10/18/2019    Overweight    Pain in left arm 02/21/2022    Pain of left upper extremity    Pain in right toe(s) 09/24/2021    Toe pain, right    Pain, unspecified 02/21/2022    Aches    Somnolence 12/17/2021    Daytime sleepiness    Unspecified abdominal pain 03/31/2016    Abdominal pain    Unspecified fall, initial encounter 10/24/2017    Fall at home    Uterine leiomyoma     Viral intestinal infection, unspecified 04/20/2022    Viral gastroenteritis    Vitamin D deficiency, unspecified 03/21/2022    Vitamin D deficiency     Past Surgical History:   Procedure Laterality Date    APPENDECTOMY  09/08/2014    Appendectomy    DILATION AND CURETTAGE OF UTERUS  09/08/2014    Dilation And Curettage    ENDOMETRIAL ABLATION N/A 03/20/2024    Hydrothermal endometrial ablation    MOUTH SURGERY  09/08/2014    Oral Surgery Tooth Extraction    OTHER SURGICAL HISTORY  11/29/2018    Knee surgery     Social History     Tobacco Use    Smoking status: Never    Smokeless tobacco: Never   Vaping Use    Vaping status: Never Used   Substance Use Topics    Alcohol use: Yes     Alcohol/week: 2.0 standard drinks of alcohol     Types: 2 Glasses of wine per week    Drug use: Never     Family History   Problem Relation Name Age of Onset    Thyroid cancer Mother      Heart disease Father      Other (eating do) Sister      Breast cancer Paternal Grandmother  70     Allergies    Allergen Reactions    Latex, Natural Rubber Rash and Shortness of breath    Tree Nuts Anaphylaxis    Hydrocodone-Acetaminophen Hives    Levaquin [Levofloxacin] Swelling and Dry mouth     Current Outpatient Medications   Medication Instructions    azelastine (Astelin) 137 mcg (0.1 %) nasal spray 1 spray, Each Nostril, 2 times daily, Use in each nostril as directed    hydroCHLOROthiazide (MICROZIDE) 12.5 mg, oral, Every 24 hours    ibuprofen 600 mg, oral, Every 6 hours    latanoprost 0.005 % drops, emulsion     metFORMIN (GLUCOPHAGE) 500 mg, oral, 2 times daily (morning and late afternoon)    norethindrone (MICRONOR) 0.35 mg, oral, Daily       Objective       Physical Exam  Vital Signs: as recorded above  General: Well groomed, well nourished   Orientation:  Alert , oriented to time, place , and person   Mood and Affect:  Cooperative , no apparent distress normal affect  Skin: Good color, good turgor  Eyes: Extra ocular muscle movements intact, anicteric sclerae  Neck: Supple, full range of movement  Chest: Normal breath sounds, normal chest wall exam, symmetric, good air entry, clear to auscultation  Heart: Regular rate and rhythm, without murmur, gallop, or rubs  BACK:  no CTLS spine tenderness, no flank tenderness  Extremities: full range of movement  bilateral UE and bilateral LE,  no lower extremity edema  Neurological: Alert, oriented, cranial nerves II-XII grossly intact except for visual acuity  Sensation:  Intact   Gait: normal steady      Assessment/Plan   Abby Chisholm is a 48 y.o. female who presents for the concerns below:    Problem List Items Addressed This Visit    None  HYPERTENSION PLAN: blood pressure better with recheck on manual sphygmomanometer  Follow low salt diet, exercise as discussed, weight control. Continue current medications or adjust accordingly. Obtain BMP, urine microalbumin, ophthalmology exam. goal < 130/80 has seen Dr Matthew    OBESITY PLAN: Proper sleep habits, increase  water intake, we'll make sure no metabolic problems weight reduction through a low calorie diet and  exercise preferred walking.  Benefits discussed with patient followup in one.  May consider consult with nutrition and endocrinology. If not done recently will recheck TSH, BMP  Will try topamax   KNEE PAIN will try topamax but will see ortho soon for gel injction but need to get weight down for surgery     Eye checks ,floaters will eye doctor in August          Discussed with:   Return in : 2 months     Portions of this note were generated using digital voice recognition software, and may contain grammatical errors       Camila Ndiaye MD  06/17/24  1:24 PM

## 2024-06-24 ENCOUNTER — HOSPITAL ENCOUNTER (OUTPATIENT)
Dept: RADIOLOGY | Facility: EXTERNAL LOCATION | Age: 49
Discharge: HOME | End: 2024-06-24

## 2024-06-24 ENCOUNTER — OFFICE VISIT (OUTPATIENT)
Dept: ORTHOPEDIC SURGERY | Facility: HOSPITAL | Age: 49
End: 2024-06-24
Payer: COMMERCIAL

## 2024-06-24 VITALS — WEIGHT: 228 LBS | HEIGHT: 61 IN | BODY MASS INDEX: 43.05 KG/M2

## 2024-06-24 DIAGNOSIS — M17.12 PRIMARY OSTEOARTHRITIS OF LEFT KNEE: Primary | ICD-10-CM

## 2024-06-24 PROCEDURE — 99213 OFFICE O/P EST LOW 20 MIN: CPT | Performed by: FAMILY MEDICINE

## 2024-06-24 PROCEDURE — 1036F TOBACCO NON-USER: CPT | Performed by: FAMILY MEDICINE

## 2024-06-24 PROCEDURE — 2500000005 HC RX 250 GENERAL PHARMACY W/O HCPCS: Performed by: FAMILY MEDICINE

## 2024-06-24 PROCEDURE — 20611 DRAIN/INJ JOINT/BURSA W/US: CPT | Mod: LT | Performed by: FAMILY MEDICINE

## 2024-06-24 PROCEDURE — 3008F BODY MASS INDEX DOCD: CPT | Performed by: FAMILY MEDICINE

## 2024-06-24 PROCEDURE — 2500000004 HC RX 250 GENERAL PHARMACY W/ HCPCS (ALT 636 FOR OP/ED): Mod: JZ | Performed by: FAMILY MEDICINE

## 2024-06-24 RX ORDER — LIDOCAINE HYDROCHLORIDE 10 MG/ML
2 INJECTION INFILTRATION; PERINEURAL
Status: COMPLETED | OUTPATIENT
Start: 2024-06-24 | End: 2024-06-24

## 2024-06-24 ASSESSMENT — PAIN - FUNCTIONAL ASSESSMENT: PAIN_FUNCTIONAL_ASSESSMENT: 0-10

## 2024-06-24 ASSESSMENT — PAIN SCALES - GENERAL: PAINLEVEL_OUTOF10: 6

## 2024-06-24 NOTE — PROGRESS NOTES
** Please excuse any errors in grammar or translation related to this dictation. Voice recognition software was utilized to prepare this document. **    Assessment & Plan:  Patient here for ongoing management of left knee arthritis. Durolane injection completed in left knee as below. Discussed with patient that it can take 2-3 weeks for pain relief to occur. Informed patient they may have slight increased discomfort over the next week following injection.  This injection can be repeated again in 6 months if providing symptomatic relief. Patient asked to contact clinic in 5 months to start prior authorization process.  For intermittent pain can continue use of oral analgesics such as ibuprofen or acetaminophen, applying ice pack or heating pad.  If has increasing pain prior to then that is not controlled by oral analgesics, can f/u to have CSI completed.  Continue to address weight loss to get BMI under 40% to be considered surgical candidate.  All questions answered and patient is agreeable to this plan.     Chief complaint:  Left knee pain    HPI:  6/24/24: Patient following up for management of left knee arthritis.  Had steroid injection completed in March which did help to mitigate her pain for several weeks.  Unfortunately pain has subsequently returned.  She complains of constant pain at the medial and posterior knee.    3/5/24: Patient following up for left knee arthritis.  She was last seen in September and had a steroid injection completed at that time.  States show I received some mild relief following the injection which she attributes of working several long hours on her feet in the days immediately following.  Her knee pain is intermittent depending how much she is on her feet.  Denies any new injuries.  She states she is working with a dietitian in order to lose weight to promote her overall health and joint health.    9/12/24: Patient complains of left knee pain for approximately 3 years. Hx of left  knee arthroscopy in 2017 for meniscal tear. Pain is localized to anterior knee. Swelling occurs intermittently. Most recently was being seen by Dr. Morrow for this condition. Last had CSI in April 2023 which helped reduce pain for 2 months. Had 2 previous injections prior to this. She uses ibuprofen, Tylenol arthritis, and voltaren gel as needed which give her a small amount of relief.     Exam:  LEFT Knee examined. No effusion, ecchymosis, or erythema. AROM from 0 to 120 deg with 5/5 strength. SILT overlying knee. Motion crepitus present. No current tenderness along joint line or with patellar compression. Negative anterior and posterior drawer. No laxity to varus or valgus stress at 0 or 30 deg. No patellar apprehension.      Results:  Xrays of left knee obtained 12/16/22: advanced medial compartment degenerative changes with moderate changes of PF compartment.     Procedure:  Patient ID: Abby Chisholm is a 48 y.o. female.    L Inj/Asp: L knee on 6/24/2024 8:24 AM  Indications: pain  Details: 22 G needle, ultrasound-guided superolateral approach  Medications: 60 mg sodium hyaluronate 60 mg/3 mL; 2 mL lidocaine 10 mg/mL (1 %)  Outcome: tolerated well, no immediate complications    Procedure risk factors to include increased pain, bleeding, infection, neurovascular injury, soft tissue injury, and adverse reaction to medication were discussed with the patient. Patient understands there is a moderate risk of morbidity from undergoing the procedure.    Procedure, treatment alternatives, risks and benefits explained, specific risks discussed. Consent was given by the patient. Immediately prior to procedure a time out was called to verify the correct patient, procedure, equipment, support staff and site/side marked as required. Patient was prepped and draped in the usual sterile fashion.

## 2024-07-05 ENCOUNTER — APPOINTMENT (OUTPATIENT)
Dept: PRIMARY CARE | Facility: CLINIC | Age: 49
End: 2024-07-05
Payer: COMMERCIAL

## 2024-07-09 DIAGNOSIS — M25.561 PAIN IN BOTH KNEES, UNSPECIFIED CHRONICITY: ICD-10-CM

## 2024-07-09 DIAGNOSIS — R60.0 BILATERAL LEG EDEMA: ICD-10-CM

## 2024-07-09 DIAGNOSIS — M25.562 PAIN IN BOTH KNEES, UNSPECIFIED CHRONICITY: ICD-10-CM

## 2024-07-09 DIAGNOSIS — I10 BENIGN HYPERTENSION: ICD-10-CM

## 2024-07-09 DIAGNOSIS — E66.01 CLASS 3 SEVERE OBESITY WITH BODY MASS INDEX (BMI) OF 40.0 TO 44.9 IN ADULT, UNSPECIFIED OBESITY TYPE, UNSPECIFIED WHETHER SERIOUS COMORBIDITY PRESENT (MULTI): ICD-10-CM

## 2024-07-09 RX ORDER — TOPIRAMATE 25 MG/1
25 TABLET ORAL 2 TIMES DAILY
Qty: 180 TABLET | Refills: 1 | Status: SHIPPED | OUTPATIENT
Start: 2024-07-09 | End: 2025-07-09

## 2024-07-15 RX ORDER — HYDROCHLOROTHIAZIDE 25 MG/1
25 TABLET ORAL DAILY
Qty: 90 TABLET | Refills: 1 | Status: SHIPPED | OUTPATIENT
Start: 2024-07-15

## 2024-08-05 ENCOUNTER — APPOINTMENT (OUTPATIENT)
Dept: OPHTHALMOLOGY | Facility: CLINIC | Age: 49
End: 2024-08-05
Payer: COMMERCIAL

## 2024-08-05 DIAGNOSIS — H40.053 BILATERAL OCULAR HYPERTENSION: ICD-10-CM

## 2024-08-05 DIAGNOSIS — H40.1130 PRIMARY OPEN ANGLE GLAUCOMA OF BOTH EYES, UNSPECIFIED GLAUCOMA STAGE: Primary | ICD-10-CM

## 2024-08-05 PROCEDURE — 99214 OFFICE O/P EST MOD 30 MIN: CPT | Performed by: OPHTHALMOLOGY

## 2024-08-05 RX ORDER — LATANOPROST 50 UG/ML
1 SOLUTION/ DROPS OPHTHALMIC NIGHTLY
Qty: 7.5 ML | Refills: 3 | Status: SHIPPED | OUTPATIENT
Start: 2024-08-05 | End: 2025-08-05

## 2024-08-05 ASSESSMENT — GONIOSCOPY
OD_NASAL: C30F 2+
OS_INFERIOR: C30F 2+
OS_SUPERIOR: C30F 2+
OD_SUPERIOR: C30F 2+
OD_INFERIOR: C30F 2+
OD_TEMPORAL: C30F 2+
OS_TEMPORAL: C30F 2+
OS_NASAL: C30F 2+

## 2024-08-05 ASSESSMENT — CONF VISUAL FIELD
OS_INFERIOR_TEMPORAL_RESTRICTION: 0
OS_INFERIOR_NASAL_RESTRICTION: 0
OS_NORMAL: 1
OD_SUPERIOR_NASAL_RESTRICTION: 0
OS_SUPERIOR_TEMPORAL_RESTRICTION: 0
OD_NORMAL: 1
OS_SUPERIOR_NASAL_RESTRICTION: 0
OD_SUPERIOR_TEMPORAL_RESTRICTION: 0
OD_INFERIOR_NASAL_RESTRICTION: 0
OD_INFERIOR_TEMPORAL_RESTRICTION: 0

## 2024-08-05 ASSESSMENT — ENCOUNTER SYMPTOMS
MUSCULOSKELETAL NEGATIVE: 0
GASTROINTESTINAL NEGATIVE: 0
NEUROLOGICAL NEGATIVE: 0
HEMATOLOGIC/LYMPHATIC NEGATIVE: 0
CONSTITUTIONAL NEGATIVE: 0
CARDIOVASCULAR NEGATIVE: 0
PSYCHIATRIC NEGATIVE: 0
ALLERGIC/IMMUNOLOGIC NEGATIVE: 0
ENDOCRINE NEGATIVE: 0
EYES NEGATIVE: 0
RESPIRATORY NEGATIVE: 0

## 2024-08-05 ASSESSMENT — TONOMETRY
OS_IOP_MMHG: 26
OD_IOP_MMHG: 26
IOP_METHOD: GOLDMANN APPLANATION

## 2024-08-05 ASSESSMENT — VISUAL ACUITY
CORRECTION_TYPE: GLASSES
OS_CC: 20/20
METHOD: SNELLEN - LINEAR
OD_CC: 20/20

## 2024-08-05 ASSESSMENT — SLIT LAMP EXAM - LIDS
COMMENTS: NORMAL
COMMENTS: NORMAL

## 2024-08-05 ASSESSMENT — EXTERNAL EXAM - RIGHT EYE: OD_EXAM: NORMAL

## 2024-08-05 ASSESSMENT — PACHYMETRY
OS_CT(UM): 571
OD_CT(UM): 585
EXAM_DATE: 4/11/2024

## 2024-08-05 ASSESSMENT — EXTERNAL EXAM - LEFT EYE: OS_EXAM: NORMAL

## 2024-08-05 NOTE — PROGRESS NOTES
History    Chief Complaint    Glaucoma Suspect       HPI       Glaucoma Suspect    In both eyes.  Vision is blurred.             Comments    48 Year old female presents for glaucoma suspect f/u: IOP check. Pt is not taking any drops. She has noticed difficulty with reading and she is interested in progressive lenses.           Last edited by MARE Weston on 8/5/2024  1:02 PM.        Problem List  Date Reviewed: 4/11/2024      Elevated blood pressure reading    Anxiety    Bacterial vaginitis    Benign hypertension    Blood pressure elevated without history of HTN    Breast lump on left side at 10 o'clock position    Burn    Cough    Snoring    COVID-19 virus infection    Daytime sleepiness    Dysmetabolic syndrome    Edema    Epigastric pain    Esophageal reflux    Fall at home    Fatigue    Forehead contusion    Headache    Hypokalemia    Right knee pain    Left knee pain    Medial meniscus tear    Menorrhagia    Mouth sore    Obesity    Ovarian cyst    Overweight    Pain of left upper extremity    Primary osteoarthritis of left knee    Sinus congestion    Sore throat    Shoulder pain, right    Stuffy and runny nose    Abnormal uterine bleeding (AUB)    Uterine leiomyoma    PONV (postoperative nausea and vomiting)       Past Medical History:   Diagnosis Date    Abnormal uterine bleeding     Acute pharyngitis, unspecified 01/14/2022    Sore throat    Anxiety disorder, unspecified 07/28/2021    Anxiety    Arthritis     left knee arthritis    Class 3 severe obesity with body mass index (BMI) of 40.0 to 44.9 in adult (Multi) 03/08/2024    42.83 kg/m²    Contusion of other part of head, initial encounter 10/24/2017    Forehead contusion    Cough, unspecified 01/14/2022    Cough    COVID-19     COVID-19 virus infection    Dorsalgia, unspecified 05/13/2016    Back pain    Edema, unspecified 11/22/2017    Edema    Elevated blood-pressure reading, without diagnosis of hypertension 02/21/2022    Blood pressure  elevated without history of HTN    Encounter for immunization 09/21/2020    Need for tetanus booster    Encounter for immunization 09/24/2021    Flu vaccine need    Encounter for other screening for malignant neoplasm of breast 12/16/2022    Breast screening    Encounter for screening for malignant neoplasm of colon 10/08/2021    Colon cancer screening    Epigastric pain 03/31/2016    Epigastric pain    Essential (primary) hypertension 07/15/2022    Benign hypertension    GERD (gastroesophageal reflux disease)     Hypertension     Hypokalemia 05/13/2016    Hypokalemia    Nasal congestion 03/21/2022    Sinus congestion    Other abnormal and inconclusive findings on diagnostic imaging of breast 10/12/2020    Abnormal mammogram    Other fatigue 12/17/2021    Fatigue    Other lesions of oral mucosa 07/19/2016    Mouth sore    Other specified disorders of nose and nasal sinuses 01/14/2022    Stuffy and runny nose    Ovarian cyst     Ovarian cyst    Overweight 10/18/2019    Overweight    Pain in left arm 02/21/2022    Pain of left upper extremity    Pain in right toe(s) 09/24/2021    Toe pain, right    Pain, unspecified 02/21/2022    Aches    Somnolence 12/17/2021    Daytime sleepiness    Unspecified abdominal pain 03/31/2016    Abdominal pain    Unspecified fall, initial encounter 10/24/2017    Fall at home    Uterine leiomyoma     Viral intestinal infection, unspecified 04/20/2022    Viral gastroenteritis    Vitamin D deficiency, unspecified 03/21/2022    Vitamin D deficiency     Past Surgical History:   Procedure Laterality Date    APPENDECTOMY  09/08/2014    Appendectomy    DILATION AND CURETTAGE OF UTERUS  09/08/2014    Dilation And Curettage    ENDOMETRIAL ABLATION N/A 03/20/2024    Hydrothermal endometrial ablation    MOUTH SURGERY  09/08/2014    Oral Surgery Tooth Extraction    OTHER SURGICAL HISTORY  11/29/2018    Knee surgery     SOCIAL HISTORY   SMOKING:  reports that she has never smoked. She has never used  smokeless tobacco.  DRUG USE:    reports no history of drug use.    FAMILY HISTORY  family history includes Breast cancer (age of onset: 70) in her paternal grandmother; Heart disease in her father; Thyroid cancer in her mother; eating do in her sister.    CURRENT MEDICATIONS  Current Outpatient Medications (Ophthalmology pharm classes)   Medication Sig Dispense Refill    latanoprost (Xalatan) 0.005 % ophthalmic solution Administer 1 drop into both eyes once daily at bedtime. 7.5 mL 3     Current Outpatient Medications (Other)   Medication Sig Dispense Refill    azelastine (Astelin) 137 mcg (0.1 %) nasal spray Administer 1 spray into each nostril 2 times a day. Use in each nostril as directed 30 mL 12    hydroCHLOROthiazide (HYDRODiuril) 25 mg tablet TAKE 1 TABLET BY MOUTH EVERY DAY 90 tablet 1    hydroCHLOROthiazide (Microzide) 12.5 mg capsule Take 1 capsule (12.5 mg) by mouth once every 24 hours. 90 capsule 1    ibuprofen 200 mg tablet Take 3 tablets (600 mg) by mouth every 6 hours. (Patient not taking: Reported on 6/17/2024)      metFORMIN (Glucophage) 500 mg tablet Take 1 tablet (500 mg) by mouth 2 times daily (morning and late afternoon). 60 tablet 2    norethindrone (Micronor) 0.35 mg tablet TAKE 1 TABLET BY MOUTH EVERY DAY (Patient not taking: Reported on 6/17/2024) 84 tablet 0    topiramate (Topamax) 25 mg tablet TAKE 1 TABLET (25 MG) BY MOUTH 2 TIMES A DAY. MAY START 1 A DAY X 2 WEEKS 180 tablet 1       Exam   Visual Acuity (Snellen - Linear)         Right Left    Dist cc 20/20 20/20      Correction: Glasses              Edited by: Marcos Puga, COT              Not recorded       Not recorded       Pupils       Pupils         Pupils    Right PERRL, No APD    Left PERRL, No APD                  Intraocular pressure was 26 in the right eye and 26 in the left eye using Goldmann Applanation.  Extraocular Movement       Extraocular Movement         Right Left     Full Full                  Not  "recorded       Gonioscopy       Gonioscopy         Right Left    Temporal c30f 2+ c30f 2+    Nasal c30f 2+ c30f 2+    Superior c30f 2+ c30f 2+    Inferior c30f 2+ c30f 2+                   External Exam         Right Left    External Normal Normal              Slit Lamp Exam         Right Left    Lids/Lashes Normal Normal    Conjunctiva/Sclera White and quiet White and quiet    Cornea Clear Clear    Anterior Chamber Deep and quiet Deep and quiet    Iris Round and reactive Round and reactive    Lens Clear Clear    Anterior Vitreous Normal Normal                   <div id=\"[email or web address]\"></div>       Diagnostics              Plan   -  The primary encounter diagnosis was Primary open angle glaucoma of both eyes, unspecified glaucoma stage. A diagnosis of Bilateral ocular hypertension was also pertinent to this visit.  -  Referred By:  No ref. provider found  -  IOP:  Last Tonometry OD 26 / OS 26 /Date 1:08 PM      Target OD: No Value exists for the : EPIC#XYT895 Target OS: No Value exists for the : EPIC#IXA121       Max pressure OD:   Date:         Max Pressure OS:   Date:    -    Gonioscopy       Gonioscopy         Right Left    Temporal c30f 2+ c30f 2+    Nasal c30f 2+ c30f 2+    Superior c30f 2+ c30f 2+    Inferior c30f 2+ c30f 2+                    -    Not recorded       -  Pathophysiology of glaucoma, and potential blinding nature of disease reviewed.      Importance of follow up and compliance stressed  -ocular htn glaucoma suspect based on possible elevated IOP at outside provider  -testing today is reassuring, overall patient is currently low risk  -IOP increased after d/c latanoprost,   -d/w patient options of observation, SLT, or resume drop therapy  -she wishes to use drops  Restart latanoprost qhs OU  Rtc 6 months for DFE    OF NOTE patient recently started topamax, no evidence of topamax induced angle closure      "

## 2024-08-19 ENCOUNTER — APPOINTMENT (OUTPATIENT)
Dept: PRIMARY CARE | Facility: CLINIC | Age: 49
End: 2024-08-19
Payer: COMMERCIAL

## 2024-08-19 VITALS — DIASTOLIC BLOOD PRESSURE: 75 MMHG | SYSTOLIC BLOOD PRESSURE: 120 MMHG | BODY MASS INDEX: 42.32 KG/M2 | WEIGHT: 224 LBS

## 2024-08-19 DIAGNOSIS — I10 BENIGN HYPERTENSION: Primary | ICD-10-CM

## 2024-08-19 DIAGNOSIS — H40.9 GLAUCOMA, UNSPECIFIED GLAUCOMA TYPE, UNSPECIFIED LATERALITY: ICD-10-CM

## 2024-08-19 DIAGNOSIS — G43.809 OTHER MIGRAINE WITHOUT STATUS MIGRAINOSUS, NOT INTRACTABLE: ICD-10-CM

## 2024-08-19 PROCEDURE — 3074F SYST BP LT 130 MM HG: CPT | Performed by: INTERNAL MEDICINE

## 2024-08-19 PROCEDURE — 3078F DIAST BP <80 MM HG: CPT | Performed by: INTERNAL MEDICINE

## 2024-08-19 PROCEDURE — 99214 OFFICE O/P EST MOD 30 MIN: CPT | Performed by: INTERNAL MEDICINE

## 2024-08-19 PROCEDURE — 1036F TOBACCO NON-USER: CPT | Performed by: INTERNAL MEDICINE

## 2024-08-19 RX ORDER — BISMUTH SUBSALICYLATE 262 MG
1 TABLET,CHEWABLE ORAL DAILY
COMMUNITY

## 2024-08-19 ASSESSMENT — ENCOUNTER SYMPTOMS
LOSS OF SENSATION IN FEET: 0
OCCASIONAL FEELINGS OF UNSTEADINESS: 0
DEPRESSION: 1

## 2024-08-19 ASSESSMENT — PATIENT HEALTH QUESTIONNAIRE - PHQ9
1. LITTLE INTEREST OR PLEASURE IN DOING THINGS: NOT AT ALL
SUM OF ALL RESPONSES TO PHQ9 QUESTIONS 1 AND 2: 0
2. FEELING DOWN, DEPRESSED OR HOPELESS: NOT AT ALL

## 2024-08-19 NOTE — PROGRESS NOTES
Subjective   Patient ID: Abby Chisholm is a 48 y.o. female who presents for FUV.    HPI  Patient in for a visit  Had the worst migraine over the weekend, sleeping helped but work up with head weighted down   Eye pressures are back up ,  Review of Systems  General: Denies fever, chills, night sweats,  Eyes: Negative for recent visual changes  Ears, Nose, Throat :  Negative for hearing changes, sinus discomfort  Dermatologic: Negative for new skin conditions, rash  Respiratory: Negative for wheezing, shortness of breath, cough  Cardiovascular: Negative for chest pain, palpitations, or leg swelling  Gastrointestinal: Negative for nausea/vomiting, abdominal pain, changes in bowel habits  Genitourinary Negative for Urinary Incontinence  urgency , frequency, discomfort   Musculoskeletal: see hpi  Neurological: Negative for headaches, dizziness    Previous history  Past Medical History:   Diagnosis Date    Abnormal uterine bleeding     Acute pharyngitis, unspecified 01/14/2022    Sore throat    Anxiety disorder, unspecified 07/28/2021    Anxiety    Arthritis     left knee arthritis    Class 3 severe obesity with body mass index (BMI) of 40.0 to 44.9 in adult (Multi) 03/08/2024    42.83 kg/m²    Contusion of other part of head, initial encounter 10/24/2017    Forehead contusion    Cough, unspecified 01/14/2022    Cough    COVID-19     COVID-19 virus infection    Dorsalgia, unspecified 05/13/2016    Back pain    Edema, unspecified 11/22/2017    Edema    Elevated blood-pressure reading, without diagnosis of hypertension 02/21/2022    Blood pressure elevated without history of HTN    Encounter for immunization 09/21/2020    Need for tetanus booster    Encounter for immunization 09/24/2021    Flu vaccine need    Encounter for other screening for malignant neoplasm of breast 12/16/2022    Breast screening    Encounter for screening for malignant neoplasm of colon 10/08/2021    Colon cancer screening    Epigastric pain  03/31/2016    Epigastric pain    Essential (primary) hypertension 07/15/2022    Benign hypertension    GERD (gastroesophageal reflux disease)     Hypertension     Hypokalemia 05/13/2016    Hypokalemia    Nasal congestion 03/21/2022    Sinus congestion    Other abnormal and inconclusive findings on diagnostic imaging of breast 10/12/2020    Abnormal mammogram    Other fatigue 12/17/2021    Fatigue    Other lesions of oral mucosa 07/19/2016    Mouth sore    Other specified disorders of nose and nasal sinuses 01/14/2022    Stuffy and runny nose    Ovarian cyst     Ovarian cyst    Overweight 10/18/2019    Overweight    Pain in left arm 02/21/2022    Pain of left upper extremity    Pain in right toe(s) 09/24/2021    Toe pain, right    Pain, unspecified 02/21/2022    Aches    Somnolence 12/17/2021    Daytime sleepiness    Unspecified abdominal pain 03/31/2016    Abdominal pain    Unspecified fall, initial encounter 10/24/2017    Fall at home    Uterine leiomyoma     Viral intestinal infection, unspecified 04/20/2022    Viral gastroenteritis    Vitamin D deficiency, unspecified 03/21/2022    Vitamin D deficiency     Past Surgical History:   Procedure Laterality Date    APPENDECTOMY  09/08/2014    Appendectomy    DILATION AND CURETTAGE OF UTERUS  09/08/2014    Dilation And Curettage    ENDOMETRIAL ABLATION N/A 03/20/2024    Hydrothermal endometrial ablation    MOUTH SURGERY  09/08/2014    Oral Surgery Tooth Extraction    OTHER SURGICAL HISTORY  11/29/2018    Knee surgery     Social History     Tobacco Use    Smoking status: Never    Smokeless tobacco: Never   Vaping Use    Vaping status: Never Used   Substance Use Topics    Alcohol use: Yes     Alcohol/week: 2.0 standard drinks of alcohol     Types: 2 Glasses of wine per week    Drug use: Never     Family History   Problem Relation Name Age of Onset    Thyroid cancer Mother      Heart disease Father      Other (eating do) Sister      Breast cancer Paternal Grandmother  70      Allergies   Allergen Reactions    Latex, Natural Rubber Rash and Shortness of breath    Tree Nuts Anaphylaxis    Hydrocodone-Acetaminophen Hives    Levaquin [Levofloxacin] Swelling and Dry mouth     Current Outpatient Medications   Medication Instructions    azelastine (Astelin) 137 mcg (0.1 %) nasal spray 1 spray, Each Nostril, 2 times daily, Use in each nostril as directed    hydroCHLOROthiazide (HYDRODIURIL) 25 mg, oral, Daily    hydroCHLOROthiazide (MICROZIDE) 12.5 mg, oral, Every 24 hours    ibuprofen 600 mg, oral, Every 6 hours    latanoprost (Xalatan) 0.005 % ophthalmic solution 1 drop, Both Eyes, Nightly    metFORMIN (GLUCOPHAGE) 500 mg, oral, 2 times daily (morning and late afternoon)    multivitamin tablet 1 tablet, oral, Daily    norethindrone (MICRONOR) 0.35 mg, oral, Daily    topiramate (TOPAMAX) 25 mg, oral, 2 times daily, May start 1 a day x 2 weeks       Objective       Physical Exam  Vital Signs: as recorded above  General: Well groomed, well nourished   Orientation:  Alert , oriented to time, place , and person   Mood and Affect:  Cooperative , no apparent distress normal affect  Skin: Good color, good turgor  Eyes: Extra ocular muscle movements intact, anicteric sclerae  Ears Tms dull no erythema   Neck: Supple, full range of movement  Chest: Normal breath sounds, normal chest wall exam, symmetric, good air entry, clear to auscultation  Heart: Regular rate and rhythm, without murmur, gallop, or rubs  BACK:  no CTLS spine tenderness, no flank tenderness  Extremities: full range of movement  bilateral UE and bilateral LE,  no lower extremity edema  Neurological: Alert, oriented, cranial nerves II-XII grossly intact except for visual acuity  Sensation:  Intact   Gait: normal steady      Assessment/Plan   Abby Chisholm is a 48 y.o. female who presents for the concerns below:    Problem List Items Addressed This Visit    None  Eye pressure - eye md put her back on eye drops     Migraine , does  not like the medication more anxiety , only taking one   Had two migranes since taking it more than what hse had in thye past      Thyroid cancer   Obesity had seen dr osorio in the past did the [facility]   Will return since other meds are CI for either glaucoma  not       We can check your blood count to check from anemia , if still with spotting then see DR Mejia   Take allegra daily during the ragweed season and use azelastine nasal spray until you get back from your  trip     Discussed with:   Return in :    Portions of this note were generated using digital voice recognition software, and may contain grammatical errors       Camila Ndiaye MD  08/19/24  3:49 PM

## 2024-08-19 NOTE — PATIENT INSTRUCTIONS
make sure no change in pattern or severity plot headache diary, rest / sleep , hydration 6-8 glasses of water, try to identify trigger agents and avoid trigger agents.Try magnesium 500 mg a day watch out for loose stools , b complex vitamin with riboflavin, melatonin 3-5 mg at bedtime if needed    If not better will add low dose verapamil (which is for blood pressure but also for prophylactic prevention of migraines )  consult  to Neurology if not better    If the headaches continue check blood work for anemia (orders are in)       Take allegra daily during the ragweed season and use azelastine nasal spray until you get back from your  trip

## 2024-09-03 ENCOUNTER — APPOINTMENT (OUTPATIENT)
Dept: OBSTETRICS AND GYNECOLOGY | Facility: CLINIC | Age: 49
End: 2024-09-03
Payer: COMMERCIAL

## 2024-09-03 VITALS — WEIGHT: 227 LBS | SYSTOLIC BLOOD PRESSURE: 130 MMHG | BODY MASS INDEX: 42.89 KG/M2 | DIASTOLIC BLOOD PRESSURE: 82 MMHG

## 2024-09-03 DIAGNOSIS — N93.9 ABNORMAL UTERINE BLEEDING (AUB): Primary | ICD-10-CM

## 2024-09-03 PROCEDURE — 99214 OFFICE O/P EST MOD 30 MIN: CPT | Performed by: OBSTETRICS & GYNECOLOGY

## 2024-09-03 PROCEDURE — 3079F DIAST BP 80-89 MM HG: CPT | Performed by: OBSTETRICS & GYNECOLOGY

## 2024-09-03 PROCEDURE — 1036F TOBACCO NON-USER: CPT | Performed by: OBSTETRICS & GYNECOLOGY

## 2024-09-03 PROCEDURE — 3075F SYST BP GE 130 - 139MM HG: CPT | Performed by: OBSTETRICS & GYNECOLOGY

## 2024-09-03 RX ORDER — NORETHINDRONE 0.35 MG/1
1 TABLET ORAL DAILY
Qty: 28 TABLET | Refills: 5 | Status: SHIPPED | OUTPATIENT
Start: 2024-09-03 | End: 2025-09-03

## 2024-09-03 RX ORDER — MEDROXYPROGESTERONE ACETATE 10 MG/1
TABLET ORAL
Qty: 14 TABLET | Refills: 0 | Status: SHIPPED | OUTPATIENT
Start: 2024-09-03

## 2024-09-03 NOTE — PROGRESS NOTES
ASSESSMENT/PLAN  Abnormal uterine bleeding (AUB). Bleeding is light but unpredictable.   S/p hydrothermal endometrial ablation.  Pt was on norethindrone prior to her ablation and is amenable to restarting the progesterone only pill. First will trigger a withdrawal bleed.   - medroxyPROGESTERone (Provera) 10 mg tablet; Take 1 tablet by mouth daily for 14 days beginning on approx day 16 of the menstrual cycle.  Dispense: 14 tablet; Refill: 0  The medroxyprogesterone may trigger bleeding once completed. Start the norethindrone pill once you have completed the medroxyprogesterone.   - norethindrone (Micronor) 0.35 mg tablet; Take 1 tablet (0.35 mg) over 28 days by mouth once daily.  Dispense: 28 tablet; Refill: 5     SUBJECTIVE    HPI    47 yo s/p hydrothermal endometrial ablation 3/20/2024 c/o bleeding.  Stayed on progesterone only pill for one month after ablation. After that, still having cycles, but they were lighter, still fairly predictable. Had cycle May, June, and in July cycle was late. Bled on and off all through August , brownish spotting. Last week heavier passing small clots. No significant cramping or pain. This past month having hot flashes. TSH normal 12/2023.  Nl endometrial biopsy 11/2023.   Pt is okay with still having cycles but does not like the unpredictability. She travels for work and this negatively impacts her life.       OBJECTIVE    /82   Wt 103 kg (227 lb)   BMI 42.89 kg/m²     Physical Exam     Constitutional: Alert and in no acute distress. Well developed, well nourished   Abdomen: soft nontender; no abdominal mass palpated, no organomegaly and no hernias   Genitourinary: external genitalia: normal, urethra: normal, bladder: normal on palpation and perianal area: normal   Vagina: normal. Scant mucousy brown discharge.  Cervix: Normal appearing without lesions.  Psychiatric: alert and oriented x 3., affect normal to patient baseline and mood: appropriate       Kaye Mejia MD

## 2024-09-04 DIAGNOSIS — R79.9 ABNORMAL BLOOD CHEMISTRY: ICD-10-CM

## 2024-09-05 RX ORDER — METFORMIN HYDROCHLORIDE 500 MG/1
500 TABLET ORAL
Qty: 180 TABLET | Refills: 1 | Status: SHIPPED | OUTPATIENT
Start: 2024-09-05

## 2024-09-16 ENCOUNTER — TELEPHONE (OUTPATIENT)
Dept: OBSTETRICS AND GYNECOLOGY | Facility: CLINIC | Age: 49
End: 2024-09-16
Payer: COMMERCIAL

## 2024-09-18 NOTE — TELEPHONE ENCOUNTER
TC with pt 9/17/2024. Continues to have bleeding post endometrial ablation. On medroxyprogesterone, continued bleeding. Out of town and recommend pt continue medroxyprogesterone 10 mg. If bleeding continues then option of increasing to medroxyprogesterone 20 mg. Pt to contact me once she returns to town about her bleeding.

## 2024-09-23 ENCOUNTER — TELEPHONE (OUTPATIENT)
Dept: OBSTETRICS AND GYNECOLOGY | Facility: CLINIC | Age: 49
End: 2024-09-23

## 2024-09-23 DIAGNOSIS — N93.9 ABNORMAL UTERINE BLEEDING (AUB): ICD-10-CM

## 2024-09-23 RX ORDER — MEDROXYPROGESTERONE ACETATE 10 MG/1
10 TABLET ORAL 2 TIMES DAILY
Qty: 60 TABLET | Refills: 1 | Status: SHIPPED | OUTPATIENT
Start: 2024-09-23

## 2024-09-23 NOTE — PROGRESS NOTES
TC with pt. Started on medroxyprogesterone 10 mg daily 2 weeks ago. Bleeding has never completely stopped but seems to be tapering. Pt has several upcoming out of town work commitments. Plan to continue on medroxyprogesterone 10 mg daily, if bleeding becomes heavier then change to twice daily. Pt will followup next month to update me on bleeding.

## 2024-09-25 ENCOUNTER — APPOINTMENT (OUTPATIENT)
Dept: ORTHOPEDIC SURGERY | Facility: CLINIC | Age: 49
End: 2024-09-25
Payer: COMMERCIAL

## 2024-09-25 DIAGNOSIS — M17.12 PRIMARY OSTEOARTHRITIS OF LEFT KNEE: Primary | ICD-10-CM

## 2024-09-25 PROCEDURE — 99213 OFFICE O/P EST LOW 20 MIN: CPT | Performed by: FAMILY MEDICINE

## 2024-09-25 PROCEDURE — 1036F TOBACCO NON-USER: CPT | Performed by: FAMILY MEDICINE

## 2024-09-25 PROCEDURE — 20610 DRAIN/INJ JOINT/BURSA W/O US: CPT | Performed by: FAMILY MEDICINE

## 2024-09-25 RX ORDER — LIDOCAINE HYDROCHLORIDE 10 MG/ML
4 INJECTION, SOLUTION INFILTRATION; PERINEURAL
Status: COMPLETED | OUTPATIENT
Start: 2024-09-25 | End: 2024-09-25

## 2024-09-25 RX ORDER — TRIAMCINOLONE ACETONIDE 40 MG/ML
40 INJECTION, SUSPENSION INTRA-ARTICULAR; INTRAMUSCULAR
Status: COMPLETED | OUTPATIENT
Start: 2024-09-25 | End: 2024-09-25

## 2024-09-25 NOTE — PROGRESS NOTES
** Please excuse any errors in grammar or translation related to this dictation. Voice recognition software was utilized to prepare this document. **    Assessment & Plan:  Patient here for ongoing management of left arthritis. Given previous positive response to steroid injection, offered to repeat today which patient was agreeable to have completed.   Non-operative treatment options reviewed below.  - Maintaining a healthy weight: Every pound of bodyweight is about 4-5 pounds through the lower extremity. Additionally to be candidate for joint replacement surgery in the future, BMI needs to be <40%.  - Exercise program to strengthen supportive musculature.    - Activity modifications as needed to include use of cane/walker or braces.  - Prescription and otc analgesics to include but not limited to APAP, ibuprofen, naproxen, diclofenac gel.  - Steroid injection. Elected to repeat today.  - Hyaluronic acid injection. Completed June 2024, ineffective.  - Referral to pain management for potential nerve ablation.  - After failing non-operative measures, may ultimately require arthroplasty.  Informed patient that steroid injection can be repeated every 3 or more months as symptoms dictate.  Follow-up as needed for ongoing management. All questions answered and patient is agreeable to this plan.       Chief complaint:  Left knee pain    HPI:  9/25/24: Patient following up for left knee arthritis.  Patient reports the Durolane from 6/24 provided minimal relief.  She does note wearing compression stockings has alleviated some of her pain and plans to purchase a knee compression sleeve.    6/24/24: Patient following up for management of left knee arthritis.  Had steroid injection completed in March which did help to mitigate her pain for several weeks.  Unfortunately pain has subsequently returned.  She complains of constant pain at the medial and posterior knee.    3/5/24: Patient following up for left knee arthritis.  She  was last seen in September and had a steroid injection completed at that time.  States show I received some mild relief following the injection which she attributes of working several long hours on her feet in the days immediately following.  Her knee pain is intermittent depending how much she is on her feet.  Denies any new injuries.  She states she is working with a dietitian in order to lose weight to promote her overall health and joint health.    9/12/24: Patient complains of left knee pain for approximately 3 years. Hx of left knee arthroscopy in 2017 for meniscal tear. Pain is localized to anterior knee. Swelling occurs intermittently. Most recently was being seen by Dr. Morrow for this condition. Last had CSI in April 2023 which helped reduce pain for 2 months. Had 2 previous injections prior to this. She uses ibuprofen, Tylenol arthritis, and voltaren gel as needed which give her a small amount of relief.     Exam:  LEFT Knee examined. No effusion, ecchymosis, or erythema. AROM from 0 to 120 deg with 5/5 strength. SILT overlying knee. Motion crepitus present. No current tenderness along joint line or with patellar compression. Negative anterior and posterior drawer. No laxity to varus or valgus stress at 0 or 30 deg. No patellar apprehension.      Results:  Xrays of left knee obtained 12/16/22: advanced medial compartment degenerative changes with moderate changes of PF compartment.     Procedure:  Patient ID: Abby Chisholm is a 49 y.o. female.    L Inj/Asp: L knee on 9/25/2024 4:28 PM  Indications: pain  Details: 25 G needle, anteromedial approach  Medications: 40 mg triamcinolone acetonide 40 mg/mL; 4 mL lidocaine 10 mg/mL (1 %)  Outcome: tolerated well, no immediate complications    Procedure risk factors to include increased pain, bleeding, infection, neurovascular injury, soft tissue injury, transient elevation of blood glucose and blood pressure, and adverse reaction to medication were  discussed with the patient. Patient understands there is a moderate risk of morbidity from undergoing the procedure.    Procedure, treatment alternatives, risks and benefits explained, specific risks discussed. Consent was given by the patient. Immediately prior to procedure a time out was called to verify the correct patient, procedure, equipment, support staff and site/side marked as required. Patient was prepped and draped in the usual sterile fashion.

## 2024-09-28 DIAGNOSIS — I10 BENIGN HYPERTENSION: ICD-10-CM

## 2024-09-30 RX ORDER — HYDROCHLOROTHIAZIDE 12.5 MG/1
12.5 CAPSULE ORAL EVERY 24 HOURS
Qty: 90 CAPSULE | Refills: 1 | Status: SHIPPED | OUTPATIENT
Start: 2024-09-30

## 2024-10-15 DIAGNOSIS — N93.9 ABNORMAL UTERINE BLEEDING (AUB): ICD-10-CM

## 2024-11-22 ENCOUNTER — APPOINTMENT (OUTPATIENT)
Dept: PRIMARY CARE | Facility: CLINIC | Age: 49
End: 2024-11-22
Payer: COMMERCIAL

## 2024-11-22 VITALS
DIASTOLIC BLOOD PRESSURE: 84 MMHG | SYSTOLIC BLOOD PRESSURE: 136 MMHG | HEIGHT: 61 IN | BODY MASS INDEX: 43.23 KG/M2 | WEIGHT: 229 LBS

## 2024-11-22 DIAGNOSIS — Z23 FLU VACCINE NEED: ICD-10-CM

## 2024-11-22 DIAGNOSIS — Z00.00 HEALTHCARE MAINTENANCE: Primary | ICD-10-CM

## 2024-11-22 PROCEDURE — 3079F DIAST BP 80-89 MM HG: CPT | Performed by: INTERNAL MEDICINE

## 2024-11-22 PROCEDURE — 90656 IIV3 VACC NO PRSV 0.5 ML IM: CPT | Performed by: INTERNAL MEDICINE

## 2024-11-22 PROCEDURE — 3075F SYST BP GE 130 - 139MM HG: CPT | Performed by: INTERNAL MEDICINE

## 2024-11-22 PROCEDURE — 90471 IMMUNIZATION ADMIN: CPT | Performed by: INTERNAL MEDICINE

## 2024-11-22 PROCEDURE — 3008F BODY MASS INDEX DOCD: CPT | Performed by: INTERNAL MEDICINE

## 2024-11-22 PROCEDURE — 99396 PREV VISIT EST AGE 40-64: CPT | Performed by: INTERNAL MEDICINE

## 2024-11-22 RX ORDER — B-COMPLEX WITH VITAMIN C
1 TABLET ORAL DAILY
COMMUNITY

## 2024-11-22 ASSESSMENT — PATIENT HEALTH QUESTIONNAIRE - PHQ9
SUM OF ALL RESPONSES TO PHQ9 QUESTIONS 1 AND 2: 0
2. FEELING DOWN, DEPRESSED OR HOPELESS: NOT AT ALL
1. LITTLE INTEREST OR PLEASURE IN DOING THINGS: NOT AT ALL

## 2024-11-22 NOTE — PROGRESS NOTES
Subjective   Patient ID: Abby Chisholm is a 49 y.o. female who presents for Annual Exam (Medication for headaches but used for weight loss gave terrible side effects, can't remember name please look through notes. ).    HPI  Patient comes in for a physical exam last one done over a year ago , doing well over-all with no particular complaints. Also is in for laboratory review and health maintenance update.  Updating family history as well.  Interval event - past medical history, surgical, social, and family history reviewed and updated.  Interval care -  Patient is    up to date with dental care.  Patient does     receive routine vision care.  Had been on topamax   Her menstrual period was long , on birth control Dr Mejia did prescribe it again medroxy did not take it yet   Currently headaches under control   She will continue to see therapist and we would like to revisit phentermine     Review of Systems  General: Denies fever, chills, night sweats, changes in appetite or weight  ENT: Negative for ear pain, hearing loss, headache, difficulty swallowing, up to date with dental checks   Eyes: Negative for recent visual changes, up to date with eye exams  Dermatologic: Negative for new skin conditions, rash  Respiratory: Negative for paroxysmal nocturnal dyspnea, wheezing,shortness of breath, cough  Cardiovascular: Negative for chest pain, palpitations, or leg swelling  Gastrointestinal: Negative for nausea/vomiting, abdominal pain, changes in bowel habits  Genitourinary: Negative for dysuria, urgency, frequency  URINARY INCONTINENCE   Neurological: Negative for headaches, tremors, dizziness, memory loss, confusion, weakness, paresthesias  Psychiatric: Negative for sleep problems, anxiety, depression, conditions are stable  Endocrine: Negative for heat or cold intolerance, polyuria, polydipsia  Other:All systems have been reviewed and are negative except as previously noted.      Previous history  Past Medical  History:   Diagnosis Date   • Abnormal uterine bleeding    • Acute pharyngitis, unspecified 01/14/2022    Sore throat   • Anxiety disorder, unspecified 07/28/2021    Anxiety   • Arthritis     left knee arthritis   • Class 3 severe obesity with body mass index (BMI) of 40.0 to 44.9 in adult (Multi) 03/08/2024    42.83 kg/m²   • Contusion of other part of head, initial encounter 10/24/2017    Forehead contusion   • Cough, unspecified 01/14/2022    Cough   • COVID-19     COVID-19 virus infection   • Dorsalgia, unspecified 05/13/2016    Back pain   • Edema, unspecified 11/22/2017    Edema   • Elevated blood-pressure reading, without diagnosis of hypertension 02/21/2022    Blood pressure elevated without history of HTN   • Encounter for immunization 09/21/2020    Need for tetanus booster   • Encounter for immunization 09/24/2021    Flu vaccine need   • Encounter for other screening for malignant neoplasm of breast 12/16/2022    Breast screening   • Encounter for screening for malignant neoplasm of colon 10/08/2021    Colon cancer screening   • Epigastric pain 03/31/2016    Epigastric pain   • Essential (primary) hypertension 07/15/2022    Benign hypertension   • GERD (gastroesophageal reflux disease)    • Hypertension    • Hypokalemia 05/13/2016    Hypokalemia   • Nasal congestion 03/21/2022    Sinus congestion   • Other abnormal and inconclusive findings on diagnostic imaging of breast 10/12/2020    Abnormal mammogram   • Other fatigue 12/17/2021    Fatigue   • Other lesions of oral mucosa 07/19/2016    Mouth sore   • Other specified disorders of nose and nasal sinuses 01/14/2022    Stuffy and runny nose   • Ovarian cyst     Ovarian cyst   • Overweight 10/18/2019    Overweight   • Pain in left arm 02/21/2022    Pain of left upper extremity   • Pain in right toe(s) 09/24/2021    Toe pain, right   • Pain, unspecified 02/21/2022    Aches   • Somnolence 12/17/2021    Daytime sleepiness   • Unspecified abdominal pain  03/31/2016    Abdominal pain   • Unspecified fall, initial encounter 10/24/2017    Fall at home   • Uterine leiomyoma    • Viral intestinal infection, unspecified 04/20/2022    Viral gastroenteritis   • Vitamin D deficiency, unspecified 03/21/2022    Vitamin D deficiency     Past Surgical History:   Procedure Laterality Date   • APPENDECTOMY  09/08/2014    Appendectomy   • DILATION AND CURETTAGE OF UTERUS  09/08/2014    Dilation And Curettage   • ENDOMETRIAL ABLATION N/A 03/20/2024    Hydrothermal endometrial ablation   • MOUTH SURGERY  09/08/2014    Oral Surgery Tooth Extraction   • OTHER SURGICAL HISTORY  11/29/2018    Knee surgery     Social History     Tobacco Use   • Smoking status: Never   • Smokeless tobacco: Never   Vaping Use   • Vaping status: Never Used   Substance Use Topics   • Alcohol use: Yes     Alcohol/week: 2.0 standard drinks of alcohol     Types: 2 Glasses of wine per week   • Drug use: Never     Family History   Problem Relation Name Age of Onset   • Thyroid cancer Mother     • Heart disease Father     • Other (eating do) Sister     • Breast cancer Paternal Grandmother  70     Allergies   Allergen Reactions   • Latex, Natural Rubber Rash and Shortness of breath   • Tree Nuts Anaphylaxis   • Hydrocodone-Acetaminophen Hives   • Levaquin [Levofloxacin] Swelling and Dry mouth   • Lorcet (Propoxyphene) Itching and Rash     Sore throat     Current Outpatient Medications   Medication Instructions   • azelastine (Astelin) 137 mcg (0.1 %) nasal spray 1 spray, Each Nostril, 2 times daily, Use in each nostril as directed   • B complex-vitamin C tablet 1 tablet, Daily   • cholecalciferol, vitamin D3, (VITAMIN D3 ORAL) Take by mouth.   • hydroCHLOROthiazide (HYDRODIURIL) 25 mg, oral, Daily   • hydroCHLOROthiazide (MICROZIDE) 12.5 mg, oral, Every 24 hours   • ibuprofen 600 mg, oral, Every 6 hours   • latanoprost (Xalatan) 0.005 % ophthalmic solution 1 drop, Both Eyes, Nightly   • MAGNESIUM ORAL Take by mouth.    • medroxyPROGESTERone (PROVERA) 10 mg, oral, 2 times daily   • metFORMIN (GLUCOPHAGE) 500 mg, oral, 2 times daily (morning and late afternoon)   • multivitamin tablet 1 tablet, Daily   • norethindrone (MICRONOR) 0.35 mg, oral, Daily       Objective       Physical Exam  Vital Signs: as recorded above  General: Well groomed, well nourished   Orientation:  Alert , oriented to time, place , and person   Mood and Affect:  Cooperative , no apparent distress normal affect  Skin: Good color, good turgor  Eyes: Extra ocular muscle movements intact, anicteric sclerae  Neck: Supple, full range of movement  Chest: Normal breath sounds, normal chest wall exam, symmetric, good air entry, clear to auscultation  Heart: Regular rate and rhythm, without murmur, gallop, or rubs  Abdomen soft nontender no masses felt no hepatosplenomegaly, no rebound or guarding  BACK:  no CTLS spine tenderness, no flank tenderness  Extremities: full range of movement  bilateral UE and bilateral LE,  no lower extremity edema  Neurological: Alert, oriented, cranial nerves II-XII intact except for visual acuity  Sensation:  Intact   Gait: normal steady      Assessment/Plan   Abby Chisholm is a 49 y.o. female who presents for the concerns below:    Problem List Items Addressed This Visit    None  She did see Dr Kam and will have that option hs not cancelled it yet     ASSESSMENT AND PLAN: Patient on examination is in good health , concerns above, screening blood tests to screen for high cholesterol, diabetes, thyroid,  Patient should be taking enough calcium in a balanced diet or supplements to total 1200 mg a day in divided doses unless with history of specific types of kidney stones. Vitamin D 800-1000 iu a day, check levels since  last lab work done showed slightly low levels.  . For Female Patients Only:PAP test yearly as per gynecology   Preventive Medicine: colon cancer screening by age 45 if no family history, balanced diet, and exercise  as discussed. Seat belt use for injury prevention  Substance use and /or tobacco use not applicable / counseled when applicable. Immunizations TD  up to date.  Yearly flu vaccine unless contraindicated . More than 50% of office visit time spent counseling the patient, questions were answered. Complete physical examination in a year. Patient knows either has access to  [facility] to see results and messages regarding these or will call us if cannot see them           Discussed with:   Return in :    Portions of this note were generated using digital voice recognition software, and may contain grammatical errors       Camila Ndiaye MD  11/22/24  1:41 PM

## 2024-12-06 DIAGNOSIS — E66.813 CLASS 3 SEVERE OBESITY WITH BODY MASS INDEX (BMI) OF 40.0 TO 44.9 IN ADULT, UNSPECIFIED OBESITY TYPE, UNSPECIFIED WHETHER SERIOUS COMORBIDITY PRESENT: ICD-10-CM

## 2024-12-06 DIAGNOSIS — E66.01 CLASS 3 SEVERE OBESITY WITH BODY MASS INDEX (BMI) OF 40.0 TO 44.9 IN ADULT, UNSPECIFIED OBESITY TYPE, UNSPECIFIED WHETHER SERIOUS COMORBIDITY PRESENT: ICD-10-CM

## 2024-12-16 ENCOUNTER — TELEPHONE (OUTPATIENT)
Dept: OBSTETRICS AND GYNECOLOGY | Facility: CLINIC | Age: 49
End: 2024-12-16
Payer: COMMERCIAL

## 2024-12-17 ENCOUNTER — APPOINTMENT (OUTPATIENT)
Dept: OBSTETRICS AND GYNECOLOGY | Facility: CLINIC | Age: 49
End: 2024-12-17
Payer: COMMERCIAL

## 2024-12-23 ENCOUNTER — LAB (OUTPATIENT)
Dept: LAB | Facility: LAB | Age: 49
End: 2024-12-23
Payer: COMMERCIAL

## 2024-12-23 ENCOUNTER — APPOINTMENT (OUTPATIENT)
Dept: ENDOCRINOLOGY | Facility: CLINIC | Age: 49
End: 2024-12-23
Payer: COMMERCIAL

## 2024-12-23 VITALS
HEIGHT: 61 IN | WEIGHT: 231 LBS | DIASTOLIC BLOOD PRESSURE: 82 MMHG | BODY MASS INDEX: 43.61 KG/M2 | SYSTOLIC BLOOD PRESSURE: 124 MMHG

## 2024-12-23 DIAGNOSIS — E78.5 HYPERLIPIDEMIA, UNSPECIFIED HYPERLIPIDEMIA TYPE: ICD-10-CM

## 2024-12-23 DIAGNOSIS — Z00.00 HEALTHCARE MAINTENANCE: ICD-10-CM

## 2024-12-23 DIAGNOSIS — G43.809 OTHER MIGRAINE WITHOUT STATUS MIGRAINOSUS, NOT INTRACTABLE: ICD-10-CM

## 2024-12-23 DIAGNOSIS — G47.33 OBSTRUCTIVE SLEEP APNEA: ICD-10-CM

## 2024-12-23 DIAGNOSIS — E88.810 DYSMETABOLIC SYNDROME: Primary | ICD-10-CM

## 2024-12-23 DIAGNOSIS — I10 BENIGN HYPERTENSION: ICD-10-CM

## 2024-12-23 LAB
ALBUMIN SERPL BCP-MCNC: 4.1 G/DL (ref 3.4–5)
ALP SERPL-CCNC: 61 U/L (ref 33–110)
ALT SERPL W P-5'-P-CCNC: 18 U/L (ref 7–45)
ANION GAP SERPL CALC-SCNC: 12 MMOL/L (ref 10–20)
APPEARANCE UR: CLEAR
AST SERPL W P-5'-P-CCNC: 19 U/L (ref 9–39)
BASOPHILS # BLD AUTO: 0.08 X10*3/UL (ref 0–0.1)
BASOPHILS NFR BLD AUTO: 0.9 %
BILIRUB SERPL-MCNC: 0.5 MG/DL (ref 0–1.2)
BILIRUB UR STRIP.AUTO-MCNC: NEGATIVE MG/DL
BUN SERPL-MCNC: 12 MG/DL (ref 6–23)
CALCIUM SERPL-MCNC: 9.4 MG/DL (ref 8.6–10.3)
CHLORIDE SERPL-SCNC: 104 MMOL/L (ref 98–107)
CHOLEST SERPL-MCNC: 181 MG/DL (ref 0–199)
CHOLESTEROL/HDL RATIO: 3.3
CO2 SERPL-SCNC: 27 MMOL/L (ref 21–32)
COLOR UR: YELLOW
CREAT SERPL-MCNC: 0.63 MG/DL (ref 0.5–1.05)
EGFRCR SERPLBLD CKD-EPI 2021: >90 ML/MIN/1.73M*2
EOSINOPHIL # BLD AUTO: 0.32 X10*3/UL (ref 0–0.7)
EOSINOPHIL NFR BLD AUTO: 3.7 %
ERYTHROCYTE [DISTWIDTH] IN BLOOD BY AUTOMATED COUNT: 13.3 % (ref 11.5–14.5)
GLUCOSE SERPL-MCNC: 95 MG/DL (ref 74–99)
GLUCOSE UR STRIP.AUTO-MCNC: NORMAL MG/DL
HCT VFR BLD AUTO: 42.8 % (ref 36–46)
HDLC SERPL-MCNC: 55.3 MG/DL
HGB BLD-MCNC: 13.7 G/DL (ref 12–16)
IMM GRANULOCYTES # BLD AUTO: 0.02 X10*3/UL (ref 0–0.7)
IMM GRANULOCYTES NFR BLD AUTO: 0.2 % (ref 0–0.9)
KETONES UR STRIP.AUTO-MCNC: NEGATIVE MG/DL
LDLC SERPL CALC-MCNC: 100 MG/DL
LEUKOCYTE ESTERASE UR QL STRIP.AUTO: NEGATIVE
LYMPHOCYTES # BLD AUTO: 2.38 X10*3/UL (ref 1.2–4.8)
LYMPHOCYTES NFR BLD AUTO: 27.2 %
MCH RBC QN AUTO: 29.3 PG (ref 26–34)
MCHC RBC AUTO-ENTMCNC: 32 G/DL (ref 32–36)
MCV RBC AUTO: 92 FL (ref 80–100)
MONOCYTES # BLD AUTO: 0.43 X10*3/UL (ref 0.1–1)
MONOCYTES NFR BLD AUTO: 4.9 %
MUCOUS THREADS #/AREA URNS AUTO: NORMAL /LPF
NEUTROPHILS # BLD AUTO: 5.51 X10*3/UL (ref 1.2–7.7)
NEUTROPHILS NFR BLD AUTO: 63.1 %
NITRITE UR QL STRIP.AUTO: NEGATIVE
NON HDL CHOLESTEROL: 126 MG/DL (ref 0–149)
NRBC BLD-RTO: 0 /100 WBCS (ref 0–0)
PH UR STRIP.AUTO: 5.5 [PH]
PLATELET # BLD AUTO: 228 X10*3/UL (ref 150–450)
POTASSIUM SERPL-SCNC: 3.7 MMOL/L (ref 3.5–5.3)
PROT SERPL-MCNC: 7 G/DL (ref 6.4–8.2)
PROT UR STRIP.AUTO-MCNC: ABNORMAL MG/DL
RBC # BLD AUTO: 4.67 X10*6/UL (ref 4–5.2)
RBC # UR STRIP.AUTO: ABNORMAL /UL
RBC #/AREA URNS AUTO: NORMAL /HPF
SODIUM SERPL-SCNC: 139 MMOL/L (ref 136–145)
SP GR UR STRIP.AUTO: 1.03
SQUAMOUS #/AREA URNS AUTO: NORMAL /HPF
TRIGL SERPL-MCNC: 130 MG/DL (ref 0–149)
TSH SERPL-ACNC: 1.62 MIU/L (ref 0.44–3.98)
UROBILINOGEN UR STRIP.AUTO-MCNC: NORMAL MG/DL
VLDL: 26 MG/DL (ref 0–40)
WBC # BLD AUTO: 8.7 X10*3/UL (ref 4.4–11.3)
WBC #/AREA URNS AUTO: NORMAL /HPF

## 2024-12-23 PROCEDURE — 99204 OFFICE O/P NEW MOD 45 MIN: CPT | Performed by: INTERNAL MEDICINE

## 2024-12-23 PROCEDURE — 3074F SYST BP LT 130 MM HG: CPT | Performed by: INTERNAL MEDICINE

## 2024-12-23 PROCEDURE — 3008F BODY MASS INDEX DOCD: CPT | Performed by: INTERNAL MEDICINE

## 2024-12-23 PROCEDURE — 36415 COLL VENOUS BLD VENIPUNCTURE: CPT

## 2024-12-23 PROCEDURE — 3079F DIAST BP 80-89 MM HG: CPT | Performed by: INTERNAL MEDICINE

## 2024-12-23 RX ORDER — POTASSIUM CHLORIDE 750 MG/1
10 TABLET, FILM COATED, EXTENDED RELEASE ORAL 2 TIMES DAILY
Qty: 60 TABLET | Refills: 5 | Status: SHIPPED | OUTPATIENT
Start: 2024-12-23 | End: 2025-12-23

## 2024-12-23 NOTE — PROGRESS NOTES
Patient ID: Abby Chisholm is a 49 y.o. female who presents for New Patient Visit (Endocrine consult. Former patient, here for PSMF, weight loss.).  HPI  The patient is referred for evaluation for PSMF.    This is a 49-year-old female known to me from [id number] and in 2016.    She has had a problem with weight since high school.    She has been on numerous approaches with most success from PSMF.    More recently she tried topiramate but she did not tolerate it.    She is looking to go back on the program.    Weight is complicated by impaired fasting glucose knee and hip pain and she needs her left knee replaced edema sleep apnea hyperlipidemia hypertension.    She was skipping meals does some stress and boredom eating.    She is not currently exercising.    She has a past history of ovarian cyst appendectomy she has never had gout kidney stones or gallstones.    Socially she is  works as a  and does a lot of travel non-smoker drinks alcohol occasionally.    Family history positive for diabetes in her grandmother prediabetes in her father thyroid in her mother.    ROS  Comprehensive review of systems is negative.    Objective   Physical Exam  Visit Vitals  /82      Vitals:    12/23/24 1516   Weight: 105 kg (231 lb)      Body mass index is 43.65 kg/m².    Weight 231 down 11 from her high but up 45 from her low     Alert and oriented x3  In no distress  No focal neurologic deficits  No supraclavicular, or dorsal fat  No purple striae  Integument intact  Eyes normal  ENT normal. No adenopathy  Thyroid palpable and normal. No nodules  Chest clear to auscultation  Heart sounds are normal  Abdomen nontender. Bowel sounds normal. No organomegaly  Feet are okay  Reflexes normal with normal return    Current Outpatient Medications   Medication Sig Dispense Refill    azelastine (Astelin) 137 mcg (0.1 %) nasal spray Administer 1 spray into each nostril 2 times a day. Use in each nostril as  directed 30 mL 12    B complex-vitamin C tablet Take 1 tablet by mouth once daily.      cholecalciferol, vitamin D3, (VITAMIN D3 ORAL) Take by mouth.      hydroCHLOROthiazide (HYDRODiuril) 25 mg tablet TAKE 1 TABLET BY MOUTH EVERY DAY 90 tablet 1    hydroCHLOROthiazide (Microzide) 12.5 mg capsule TAKE 1 CAPSULE (12.5 MG) BY MOUTH ONCE EVERY 24 HOURS. 90 capsule 1    ibuprofen 200 mg tablet Take 3 tablets (600 mg) by mouth every 6 hours.      latanoprost (Xalatan) 0.005 % ophthalmic solution Administer 1 drop into both eyes once daily at bedtime. 7.5 mL 3    MAGNESIUM ORAL Take by mouth.      medroxyPROGESTERone (Provera) 10 mg tablet Take 1 tablet (10 mg) by mouth 2 times a day. 60 tablet 1    metFORMIN (Glucophage) 500 mg tablet TAKE 1 TABLET (500 MG) BY MOUTH 2 TIMES DAILY (MORNING AND LATE AFTERNOON). (Patient taking differently: Take 1 tablet (500 mg) by mouth once daily.) 180 tablet 1    multivitamin tablet Take 1 tablet by mouth once daily.      norethindrone (Micronor) 0.35 mg tablet Take 1 tablet (0.35 mg) over 28 days by mouth once daily. 28 tablet 5     No current facility-administered medications for this visit.       Assessment/Plan     1.  Insulin resistance with impaired fasting glucose  2.  Hypertension  3.  Hyperlipidemia  4.  Sleep apnea  5.  Edema    We discussed insulin resistance this pathophysiology and its impact.    We discussed risks, benefits and alternatives to the protein sparing modified fast.    We discussed risks, including, but not limited to the potential for electrolyte imbalance which could lead to cardiac arrhythmia.  The high protein nature of the diet increasing levels of uric acid which could lead to nephrolithiasis or gout.  The low fiber nature of the diet increasing risk for constipation.  Risk for gallstones, cold intolerance, excess skin seen with significant weight loss.      We discussed the benefits of weight loss regarding co-morbid conditions.      We also discussed  alternatives to the program, including a balanced calorie restricted approach coupled with exercise and she would like to proceed.  We'll therefore set her up with the nutritionist for dietary instruction. When she starts the program, we'll add in potassium, as well as the other supplements and keep a close eye on electrolytes.    She'll follow up with me in 2 months, sooner as needed.

## 2024-12-24 RX ORDER — MEDROXYPROGESTERONE ACETATE 10 MG/1
10 TABLET ORAL 2 TIMES DAILY
Qty: 180 TABLET | Refills: 1 | OUTPATIENT
Start: 2024-12-24

## 2024-12-26 ENCOUNTER — APPOINTMENT (OUTPATIENT)
Dept: OBSTETRICS AND GYNECOLOGY | Facility: CLINIC | Age: 49
End: 2024-12-26
Payer: COMMERCIAL

## 2024-12-26 VITALS
BODY MASS INDEX: 43.61 KG/M2 | DIASTOLIC BLOOD PRESSURE: 80 MMHG | HEIGHT: 61 IN | WEIGHT: 231 LBS | SYSTOLIC BLOOD PRESSURE: 128 MMHG

## 2024-12-26 DIAGNOSIS — Z01.419 WELL WOMAN EXAM WITH ROUTINE GYNECOLOGICAL EXAM: Primary | ICD-10-CM

## 2024-12-26 DIAGNOSIS — Z12.31 VISIT FOR SCREENING MAMMOGRAM: ICD-10-CM

## 2024-12-26 DIAGNOSIS — Z11.3 SCREENING FOR STDS (SEXUALLY TRANSMITTED DISEASES): ICD-10-CM

## 2024-12-26 DIAGNOSIS — Z12.4 CERVICAL CANCER SCREENING: ICD-10-CM

## 2024-12-26 PROCEDURE — 87661 TRICHOMONAS VAGINALIS AMPLIF: CPT

## 2024-12-26 PROCEDURE — 3079F DIAST BP 80-89 MM HG: CPT | Performed by: NURSE PRACTITIONER

## 2024-12-26 PROCEDURE — 87491 CHLMYD TRACH DNA AMP PROBE: CPT

## 2024-12-26 PROCEDURE — 3008F BODY MASS INDEX DOCD: CPT | Performed by: NURSE PRACTITIONER

## 2024-12-26 PROCEDURE — 3074F SYST BP LT 130 MM HG: CPT | Performed by: NURSE PRACTITIONER

## 2024-12-26 PROCEDURE — 1036F TOBACCO NON-USER: CPT | Performed by: NURSE PRACTITIONER

## 2024-12-26 PROCEDURE — 87591 N.GONORRHOEAE DNA AMP PROB: CPT

## 2024-12-26 PROCEDURE — 99396 PREV VISIT EST AGE 40-64: CPT | Performed by: NURSE PRACTITIONER

## 2024-12-26 ASSESSMENT — ENCOUNTER SYMPTOMS
GASTROINTESTINAL NEGATIVE: 1
CARDIOVASCULAR NEGATIVE: 1
HEMATOLOGIC/LYMPHATIC NEGATIVE: 1
NEUROLOGICAL NEGATIVE: 1
PSYCHIATRIC NEGATIVE: 1
MUSCULOSKELETAL NEGATIVE: 1
EYES NEGATIVE: 1
ALLERGIC/IMMUNOLOGIC NEGATIVE: 1
ENDOCRINE NEGATIVE: 1
CONSTITUTIONAL NEGATIVE: 1
RESPIRATORY NEGATIVE: 1

## 2024-12-26 NOTE — PROGRESS NOTES
"Chief Complaint    Annual Exam        HPI    PAP 1-6-22 NEG HPV NEG  MAMMO 12-29-23  DEXA NEVER  COLON 1-15-21    CHAPERONE: DECLINED  Last edited by Irving Londono MA on 12/26/2024  1:38 PM.         49 y.o. G0 female presents for annual well woman exam.   H/O endometrial ablation and salpingectomy in 07/2020 with Dr. Mejia.   Saw Dr. Mejia again last year for AUB. Did not want to pursue hysterectomy. Underwent hysteroscopy, ablation in 03/2024. Started on Norethindrone daily.   Patient's menstrual cycles are irregular on the pill, lasting 5-9 days. At this time periods manageable.   She is sexually active. Reports 2 partner(s) in the last year. Denies dyspareunia.   Condoms: Never  Amendable STD testing today. No hx. of STDs.  She denies any breast concerns.   Pap hx. normal. Last pap: 01/2022 negative and negative HPV  Denies pelvic pain.  Denies abnormal vaginal symptoms.   Denies urinary or bowel concerns. Colonoscopy: 01/2021   She is non-smoker  PMH: Anxiety, arthritis, HTN  Family h/o breast cancer: PGM  Family h/o GYN cancer: None  Family h/o colon cancer: None    /80   Ht 1.549 m (5' 1\")   Wt 105 kg (231 lb)   LMP 12/20/2024   BMI 43.65 kg/m²      Current Outpatient Medications   Medication Instructions    azelastine (Astelin) 137 mcg (0.1 %) nasal spray 1 spray, Each Nostril, 2 times daily, Use in each nostril as directed    B complex-vitamin C tablet 1 tablet, Daily    cholecalciferol, vitamin D3, (VITAMIN D3 ORAL) Take by mouth.    hydroCHLOROthiazide (HYDRODIURIL) 25 mg, oral, Daily    hydroCHLOROthiazide (MICROZIDE) 12.5 mg, oral, Every 24 hours    ibuprofen 600 mg, oral, Every 6 hours    latanoprost (Xalatan) 0.005 % ophthalmic solution 1 drop, Both Eyes, Nightly    MAGNESIUM ORAL Take by mouth.    metFORMIN (GLUCOPHAGE) 500 mg, oral, 2 times daily (morning and late afternoon)    multivitamin tablet 1 tablet, Daily    norethindrone (MICRONOR) 0.35 mg, oral, Daily    potassium chloride CR " 10 mEq ER tablet 10 mEq, oral, 2 times daily, Do not crush, chew, or split.        Review of Systems   Constitutional: Negative.    HENT: Negative.     Eyes: Negative.    Respiratory: Negative.     Cardiovascular: Negative.    Gastrointestinal: Negative.    Endocrine: Negative.    Genitourinary:  Positive for vaginal bleeding and vaginal discharge.   Musculoskeletal: Negative.    Skin: Negative.    Allergic/Immunologic: Negative.    Neurological: Negative.    Hematological: Negative.    Psychiatric/Behavioral: Negative.     All other systems reviewed and are negative.       Physical Exam  Constitutional:       Appearance: Normal appearance.   HENT:      Head: Normocephalic.      Nose: Nose normal.   Cardiovascular:      Rate and Rhythm: Normal rate and regular rhythm.   Pulmonary:      Effort: Pulmonary effort is normal.      Breath sounds: Normal breath sounds.   Chest:   Breasts:     Right: Normal.      Left: Normal.   Abdominal:      General: Abdomen is flat.      Palpations: Abdomen is soft.   Genitourinary:     General: Normal vulva.      Vagina: Normal.      Cervix: Normal.      Uterus: Normal.       Adnexa: Right adnexa normal and left adnexa normal.      Rectum: Normal.      Comments: Pap collected  Musculoskeletal:         General: Normal range of motion.      Cervical back: Normal range of motion and neck supple.   Skin:     General: Skin is warm and dry.   Neurological:      Mental Status: She is alert.   Psychiatric:         Mood and Affect: Mood normal.         Behavior: Behavior normal.          Assessment/Plan:   1. Well woman exam with routine gynecological exam (Primary)  -Pap test w/HPV testing collected today.   -STD screening for GC/CT/trich as well as blood work ordered.   -Mammogram ordered. Self breast awareness exams reviewed.  -Refills Norethindrone 0.35mg daily.   -Advised yearly well woman exams.   -Follow up sooner if needed.     2. Visit for screening mammogram  - BI mammo bilateral  screening tomosynthesis; Future    3. Cervical cancer screening  - THINPREP PAP    4. Screening for STDs (sexually transmitted diseases)  -Blood work and cultures collected:  - HIV 1/2 Antigen/Antibody Screen with Reflex to Confirmation; Future  - Hepatitis B Surface Antigen; Future  - Hepatitis C Antibody; Future  - Syphilis Screen with Reflex; Future  - THINPREP PAP   -Will notify of results.

## 2024-12-27 ENCOUNTER — LAB (OUTPATIENT)
Dept: LAB | Facility: LAB | Age: 49
End: 2024-12-27
Payer: COMMERCIAL

## 2024-12-27 ENCOUNTER — HOSPITAL ENCOUNTER (OUTPATIENT)
Dept: RADIOLOGY | Facility: HOSPITAL | Age: 49
Discharge: HOME | End: 2024-12-27
Payer: COMMERCIAL

## 2024-12-27 DIAGNOSIS — M25.562 BILATERAL CHRONIC KNEE PAIN: ICD-10-CM

## 2024-12-27 DIAGNOSIS — Z11.3 SCREENING FOR STDS (SEXUALLY TRANSMITTED DISEASES): ICD-10-CM

## 2024-12-27 DIAGNOSIS — G89.29 BILATERAL CHRONIC KNEE PAIN: ICD-10-CM

## 2024-12-27 DIAGNOSIS — M25.561 BILATERAL CHRONIC KNEE PAIN: ICD-10-CM

## 2024-12-27 PROCEDURE — 86803 HEPATITIS C AB TEST: CPT

## 2024-12-27 PROCEDURE — 73562 X-RAY EXAM OF KNEE 3: CPT | Mod: 50

## 2024-12-27 PROCEDURE — 87340 HEPATITIS B SURFACE AG IA: CPT

## 2024-12-27 PROCEDURE — 87389 HIV-1 AG W/HIV-1&-2 AB AG IA: CPT

## 2024-12-27 PROCEDURE — 86780 TREPONEMA PALLIDUM: CPT

## 2024-12-28 LAB
C TRACH RRNA SPEC QL NAA+PROBE: NEGATIVE
HBV SURFACE AG SERPL QL IA: NONREACTIVE
HCV AB SER QL: NONREACTIVE
HIV 1+2 AB+HIV1 P24 AG SERPL QL IA: NONREACTIVE
N GONORRHOEA DNA SPEC QL PROBE+SIG AMP: NEGATIVE
T VAGINALIS RRNA SPEC QL NAA+PROBE: NEGATIVE
TREPONEMA PALLIDUM IGG+IGM AB [PRESENCE] IN SERUM OR PLASMA BY IMMUNOASSAY: NONREACTIVE

## 2024-12-29 DIAGNOSIS — I10 BENIGN HYPERTENSION: ICD-10-CM

## 2024-12-31 ENCOUNTER — APPOINTMENT (OUTPATIENT)
Dept: ORTHOPEDIC SURGERY | Facility: CLINIC | Age: 49
End: 2024-12-31
Payer: COMMERCIAL

## 2024-12-31 ENCOUNTER — OFFICE VISIT (OUTPATIENT)
Dept: ORTHOPEDIC SURGERY | Facility: CLINIC | Age: 49
End: 2024-12-31
Payer: COMMERCIAL

## 2024-12-31 DIAGNOSIS — M17.0 BILATERAL PRIMARY OSTEOARTHRITIS OF KNEE: Primary | ICD-10-CM

## 2024-12-31 DIAGNOSIS — M25.562 BILATERAL CHRONIC KNEE PAIN: ICD-10-CM

## 2024-12-31 DIAGNOSIS — G89.29 BILATERAL CHRONIC KNEE PAIN: ICD-10-CM

## 2024-12-31 DIAGNOSIS — M25.561 BILATERAL CHRONIC KNEE PAIN: ICD-10-CM

## 2024-12-31 PROCEDURE — 20610 DRAIN/INJ JOINT/BURSA W/O US: CPT | Performed by: FAMILY MEDICINE

## 2024-12-31 PROCEDURE — 99214 OFFICE O/P EST MOD 30 MIN: CPT | Performed by: FAMILY MEDICINE

## 2024-12-31 PROCEDURE — 1036F TOBACCO NON-USER: CPT | Performed by: FAMILY MEDICINE

## 2024-12-31 RX ORDER — LIDOCAINE HYDROCHLORIDE 10 MG/ML
4 INJECTION, SOLUTION INFILTRATION; PERINEURAL
Status: COMPLETED | OUTPATIENT
Start: 2024-12-31 | End: 2024-12-31

## 2024-12-31 RX ORDER — TRIAMCINOLONE ACETONIDE 40 MG/ML
40 INJECTION, SUSPENSION INTRA-ARTICULAR; INTRAMUSCULAR
Status: COMPLETED | OUTPATIENT
Start: 2024-12-31 | End: 2024-12-31

## 2024-12-31 RX ORDER — HYDROCHLOROTHIAZIDE 12.5 MG/1
12.5 CAPSULE ORAL EVERY 24 HOURS
Qty: 90 CAPSULE | Refills: 1 | Status: SHIPPED | OUTPATIENT
Start: 2024-12-31

## 2024-12-31 RX ADMIN — LIDOCAINE HYDROCHLORIDE 4 ML: 10 INJECTION, SOLUTION INFILTRATION; PERINEURAL at 11:24

## 2024-12-31 RX ADMIN — TRIAMCINOLONE ACETONIDE 40 MG: 40 INJECTION, SUSPENSION INTRA-ARTICULAR; INTRAMUSCULAR at 11:24

## 2024-12-31 NOTE — PROGRESS NOTES
** Please excuse any errors in grammar or translation related to this dictation. Voice recognition software was utilized to prepare this document. **    Assessment & Plan:  Patient here for ongoing management of knee arthritis.  Disease and symptoms are more advanced in the left knee than the right.  Non-operative treatment options reviewed below.  - Maintaining a healthy weight: Every pound of bodyweight is about 4-5 pounds through the lower extremity. Additionally to be candidate for joint replacement surgery in the future, BMI needs to be <40%.  Recently started PMS diet..   - Exercise program to strengthen supportive musculature.  Advised on leg extensions, curls and press 3 sets of 10-12 reps.  Do not flex knee past 90 degrees when completing.   - Activity modifications as needed to include use of cane/walker or braces.  -OTC analgesics as needed.  Consider trial of turmeric 1000 mg daily as an anti-inflammatory.  - Steroid injection. Elected for completion today.  - Hyaluronic acid injection. Completed June 2024 in left knee was ineffective.  - Referral to pain management for potential nerve ablation.  - After failing non-operative measures, may ultimately require arthroplasty.  Informed patient that steroid injection can be repeated every 3 or more months as symptoms dictate.  Follow-up as needed for ongoing management. All questions answered and patient is agreeable to this plan.       Chief complaint:  Left knee pain, right knee pain    HPI:  12/31/24:  Patient reports the left knee cortisone injection from 9/25 provided about 2 months relief.  She attributes exacerbation of pain after being on her feet for multiple hours a day during a work conference in [place] in November.  She also has newer less severe right knee pain since August.  No mechanism of injury.  Tylenol and ibuprofen as needed gives her a small amount of relief.  Reports she recently met with endocrinologist Dr. Kam and started back on  protein sparing modified fast.    9/25/24: Patient following up for left knee arthritis.  Patient reports the Durolane from 6/24 provided minimal relief.  She does note wearing compression stockings has alleviated some of her pain and plans to purchase a knee compression sleeve.    6/24/24: Patient following up for management of left knee arthritis.  Had steroid injection completed in March which did help to mitigate her pain for several weeks.  Unfortunately pain has subsequently returned.  She complains of constant pain at the medial and posterior knee.    3/5/24: Patient following up for left knee arthritis.  She was last seen in September and had a steroid injection completed at that time.  States show I received some mild relief following the injection which she attributes of working several long hours on her feet in the days immediately following.  Her knee pain is intermittent depending how much she is on her feet.  Denies any new injuries.  She states she is working with a dietitian in order to lose weight to promote her overall health and joint health.    9/12/24: Patient complains of left knee pain for approximately 3 years. Hx of left knee arthroscopy in 2017 for meniscal tear. Pain is localized to anterior knee. Swelling occurs intermittently. Most recently was being seen by Dr. Morrow for this condition. Last had CSI in April 2023 which helped reduce pain for 2 months. Had 2 previous injections prior to this. She uses ibuprofen, Tylenol arthritis, and voltaren gel as needed which give her a small amount of relief.       Patient Active Problem List   Diagnosis    Elevated blood pressure reading    Anxiety    Bacterial vaginitis    Benign hypertension    Blood pressure elevated without history of HTN    Breast lump on left side at 10 o'clock position    Burn    Cough    Snoring    COVID-19 virus infection    Daytime sleepiness    Dysmetabolic syndrome    Edema    Epigastric pain    Esophageal reflux     Fall at home    Fatigue    Forehead contusion    Headache    Hypokalemia    Right knee pain    Left knee pain    Medial meniscus tear    Menorrhagia    Mouth sore    Obesity    Ovarian cyst    Overweight    Pain of left upper extremity    Primary osteoarthritis of left knee    Sinus congestion    Sore throat    Shoulder pain, right    Stuffy and runny nose    Abnormal uterine bleeding (AUB)    Uterine leiomyoma    PONV (postoperative nausea and vomiting)    Hyperlipidemia    Obstructive sleep apnea     Past Surgical History:   Procedure Laterality Date    APPENDECTOMY  09/08/2014    Appendectomy    DILATION AND CURETTAGE OF UTERUS  09/08/2014    Dilation And Curettage    ENDOMETRIAL ABLATION N/A 03/20/2024    Hydrothermal endometrial ablation    MOUTH SURGERY  09/08/2014    Oral Surgery Tooth Extraction    OTHER SURGICAL HISTORY  11/29/2018    Knee surgery     Current Outpatient Medications on File Prior to Visit   Medication Sig Dispense Refill    azelastine (Astelin) 137 mcg (0.1 %) nasal spray Administer 1 spray into each nostril 2 times a day. Use in each nostril as directed 30 mL 12    B complex-vitamin C tablet Take 1 tablet by mouth once daily.      cholecalciferol, vitamin D3, (VITAMIN D3 ORAL) Take by mouth.      hydroCHLOROthiazide (HYDRODiuril) 25 mg tablet TAKE 1 TABLET BY MOUTH EVERY DAY 90 tablet 1    hydroCHLOROthiazide (Microzide) 12.5 mg capsule TAKE 1 CAPSULE (12.5 MG) BY MOUTH ONCE EVERY 24 HOURS. 90 capsule 1    ibuprofen 200 mg tablet Take 3 tablets (600 mg) by mouth every 6 hours.      latanoprost (Xalatan) 0.005 % ophthalmic solution Administer 1 drop into both eyes once daily at bedtime. 7.5 mL 3    MAGNESIUM ORAL Take by mouth.      metFORMIN (Glucophage) 500 mg tablet TAKE 1 TABLET (500 MG) BY MOUTH 2 TIMES DAILY (MORNING AND LATE AFTERNOON). (Patient taking differently: Take 1 tablet (500 mg) by mouth once daily.) 180 tablet 1    multivitamin tablet Take 1 tablet by mouth once daily.       norethindrone (Micronor) 0.35 mg tablet Take 1 tablet (0.35 mg) over 28 days by mouth once daily. 28 tablet 5    potassium chloride CR 10 mEq ER tablet Take 1 tablet (10 mEq) by mouth 2 times a day. Do not crush, chew, or split. 60 tablet 5    [DISCONTINUED] hydroCHLOROthiazide (Microzide) 12.5 mg capsule TAKE 1 CAPSULE (12.5 MG) BY MOUTH ONCE EVERY 24 HOURS. 90 capsule 1    [DISCONTINUED] medroxyPROGESTERone (Provera) 10 mg tablet Take 1 tablet (10 mg) by mouth 2 times a day. 60 tablet 1     No current facility-administered medications on file prior to visit.     Exam:  LEFT Knee examined. No effusion, ecchymosis, or erythema. AROM from 0 to 120 deg with 5/5 strength. SILT overlying knee. Motion crepitus present. Medial joint line tenderness.  Negative anterior and posterior drawer. No laxity to varus or valgus stress at 0 or 30 deg. No patellar apprehension.     RIGHT Knee examined. No effusion, ecchymosis, or erythema. AROM from 0 to 120 deg with 5/5 strength. SILT overlying knee. Motion crepitus present. No joint line tenderness. Negative anterior and posterior drawer. No laxity to varus or valgus stress at 0 or 30 deg. No patellar apprehension.      General Exam:  Constitutional - NAD, AAO x 3, conversing appropriately.  HEENT- Normocephalic and atraumatic. No facial deformities. Hearing grossly normal.  Lungs - Breathing non-labored with normal rate. No accessory muscle use.  CV - Extremities warm and well-perfused, brisk capillary refill present.   Neuro - CN II-XII grossly intact.    Results:  X-rays of bilateral knees obtained 12/27/2024 demonstrate advanced degenerative changes of the left knee with varus alignment and moderate degenerative change of the right knee with joint space narrowing, subchondral sclerosis, and osteophyte formation.  Interval progression of left knee degenerative changes compared to 12/16/2022.    Lab Results   Component Value Date    HGBA1C 5.5 06/14/2024    CREATININE 0.63  12/23/2024    EGFR >90 12/23/2024     Procedure:  Patient ID: Abby Chisholm is a 49 y.o. female.    L Inj/Asp: bilateral knee on 12/31/2024 11:24 AM  Indications: pain  Details: 25 G needle, anterolateral approach  Medications (Right): 40 mg triamcinolone acetonide 40 mg/mL; 4 mL lidocaine 10 mg/mL (1 %)  Medications (Left): 40 mg triamcinolone acetonide 40 mg/mL; 4 mL lidocaine 10 mg/mL (1 %)  Outcome: tolerated well, no immediate complications    Procedure risk factors to include increased pain, bleeding, infection, neurovascular injury, soft tissue injury, progressive cartilage loss, transient elevation of blood glucose and blood pressure, and adverse reaction to medication were discussed with the patient. Patient understands there is a moderate risk of morbidity from undergoing the procedure.    Procedure, treatment alternatives, risks and benefits explained, specific risks discussed. Consent was given by the patient. Immediately prior to procedure a time out was called to verify the correct patient, procedure, equipment, support staff and site/side marked as required. Patient was prepped and draped in the usual sterile fashion.

## 2025-01-03 ENCOUNTER — TELEMEDICINE CLINICAL SUPPORT (OUTPATIENT)
Dept: ENDOCRINOLOGY | Facility: CLINIC | Age: 50
End: 2025-01-03
Payer: COMMERCIAL

## 2025-01-03 ENCOUNTER — HOSPITAL ENCOUNTER (OUTPATIENT)
Dept: RADIOLOGY | Facility: HOSPITAL | Age: 50
Discharge: HOME | End: 2025-01-03
Payer: COMMERCIAL

## 2025-01-03 VITALS — BODY MASS INDEX: 43.61 KG/M2 | HEIGHT: 61 IN | WEIGHT: 231 LBS

## 2025-01-03 DIAGNOSIS — E66.813 CLASS 3 SEVERE OBESITY WITH BODY MASS INDEX (BMI) OF 40.0 TO 44.9 IN ADULT, UNSPECIFIED OBESITY TYPE, UNSPECIFIED WHETHER SERIOUS COMORBIDITY PRESENT: ICD-10-CM

## 2025-01-03 DIAGNOSIS — E66.01 CLASS 3 SEVERE OBESITY WITH BODY MASS INDEX (BMI) OF 40.0 TO 44.9 IN ADULT, UNSPECIFIED OBESITY TYPE, UNSPECIFIED WHETHER SERIOUS COMORBIDITY PRESENT: ICD-10-CM

## 2025-01-03 DIAGNOSIS — Z12.31 VISIT FOR SCREENING MAMMOGRAM: ICD-10-CM

## 2025-01-03 PROCEDURE — 77067 SCR MAMMO BI INCL CAD: CPT

## 2025-01-03 NOTE — PROGRESS NOTES
Patient Discussion/Summary:   Met with pt on 25 for MNT discussing the [facility] diet. Patient has been on the diet back in  so she is familiar with the protocol. Started on 24 and she is in ketosis. Goal weight ~140lbs. She does not regularly exercise but does hold a gym membership. Patient has obtained required supplements and Ketostix for testing. She does travel often for work, therefore eating out will be a regular part of her routine.     Protein sparing modified fasting education handout was provided to patient and reviewed at this time along with daily protein and fluid recommendations. Understands need to take supplements, measure foods and test ketones going forward. No concerns or challenge areas presented by pt at this time. Encouraged to follow diet as outlined for optimal results. Discussed meal examples and provided encouragement. Pt engaged and asked multiple questions during session.     Lab Results   Component Value Date    HGBA1C 5.5 2024      Potassium   Date Value Ref Range Status   2024 3.7 3.5 - 5.3 mmol/L Final      Glucose   Date Value Ref Range Status   2024 95 74 - 99 mg/dL Final       Calculations:    IBW: Ideal body weight: 47.8 kg (105 lb 6.1 oz)  Adjusted ideal body weight: 70.6 kg (155 lb 10 oz)   Protein need calculated (1.5g/kg IBW) = 72g  Protein intake recommended: 77g = 11oz     Daily Meal Plan:   Mornin oz protein          Afternoon:  5 oz protein + 1 vegetable from the list   Evenin oz protein + 1 vegetable from the list   Fluids:        64-96oz per day     Recommended supplements:  Potassium as directed with breakfast and dinner (prescription)  Fiber capsules 1-2 times daily or sugar-free fiber powder (as tolerated)   Colace (stool softener) as needed, up to 4 capsules/day  Calcium: 500-600 mg, pill form twice daily with breakfast or lunch AND dinner  Multivitamin tablet or capsule per day that is low in iron (i.e. Centrum  Silver)  Magnesium: 200-250 mg, pill form with dinner or at bedtime  Bouillon cube one to two times per day as needed, if feeling dizzy   Beginning with the fourth morning of this diet, test urine every morning using Ketostix for ketone (fat) breakdown.

## 2025-01-09 ENCOUNTER — APPOINTMENT (OUTPATIENT)
Dept: OPHTHALMOLOGY | Facility: CLINIC | Age: 50
End: 2025-01-09
Payer: COMMERCIAL

## 2025-01-13 LAB
CYTOLOGY CMNT CVX/VAG CYTO-IMP: NORMAL
HPV HR 12 DNA GENITAL QL NAA+PROBE: NEGATIVE
HPV HR GENOTYPES PNL CVX NAA+PROBE: NEGATIVE
HPV16 DNA SPEC QL NAA+PROBE: NEGATIVE
HPV18 DNA SPEC QL NAA+PROBE: NEGATIVE
LAB AP HPV GENOTYPE QUESTION: YES
LAB AP HPV HR: NORMAL
LAB AP PAP ADDITIONAL TESTS: NORMAL
LABORATORY COMMENT REPORT: NORMAL
LMP START DATE: NORMAL
PATH REPORT.TOTAL CANCER: NORMAL

## 2025-01-22 ENCOUNTER — TELEPHONE (OUTPATIENT)
Dept: PRIMARY CARE | Facility: CLINIC | Age: 50
End: 2025-01-22
Payer: COMMERCIAL

## 2025-01-22 NOTE — TELEPHONE ENCOUNTER
Letter from Dr. Mnan asking Dr. Pratt to see patient for evaluation    Lm for patient to call back

## 2025-01-24 ENCOUNTER — OFFICE VISIT (OUTPATIENT)
Dept: PRIMARY CARE | Facility: CLINIC | Age: 50
End: 2025-01-24
Payer: COMMERCIAL

## 2025-01-24 VITALS — DIASTOLIC BLOOD PRESSURE: 80 MMHG | BODY MASS INDEX: 39.68 KG/M2 | WEIGHT: 210 LBS | SYSTOLIC BLOOD PRESSURE: 122 MMHG

## 2025-01-24 DIAGNOSIS — H34.211 HOLLENHORST PLAQUE, RIGHT EYE: ICD-10-CM

## 2025-01-24 DIAGNOSIS — E78.00 ELEVATED LDL CHOLESTEROL LEVEL: ICD-10-CM

## 2025-01-24 DIAGNOSIS — I10 BENIGN HYPERTENSION: Primary | ICD-10-CM

## 2025-01-24 PROCEDURE — 99214 OFFICE O/P EST MOD 30 MIN: CPT | Performed by: INTERNAL MEDICINE

## 2025-01-24 PROCEDURE — 3074F SYST BP LT 130 MM HG: CPT | Performed by: INTERNAL MEDICINE

## 2025-01-24 PROCEDURE — 1036F TOBACCO NON-USER: CPT | Performed by: INTERNAL MEDICINE

## 2025-01-24 PROCEDURE — 3079F DIAST BP 80-89 MM HG: CPT | Performed by: INTERNAL MEDICINE

## 2025-01-24 RX ORDER — ATORVASTATIN CALCIUM 10 MG/1
10 TABLET, FILM COATED ORAL DAILY
Qty: 60 TABLET | Refills: 0 | Status: SHIPPED | OUTPATIENT
Start: 2025-01-24 | End: 2025-03-25

## 2025-01-24 RX ORDER — TURMERIC 400 MG
CAPSULE ORAL
COMMUNITY

## 2025-01-24 ASSESSMENT — ENCOUNTER SYMPTOMS
LOSS OF SENSATION IN FEET: 0
DEPRESSION: 0

## 2025-01-24 ASSESSMENT — PATIENT HEALTH QUESTIONNAIRE - PHQ9
2. FEELING DOWN, DEPRESSED OR HOPELESS: NOT AT ALL
1. LITTLE INTEREST OR PLEASURE IN DOING THINGS: NOT AT ALL
SUM OF ALL RESPONSES TO PHQ9 QUESTIONS 1 AND 2: 0

## 2025-01-24 NOTE — PROGRESS NOTES
Subjective   Patient ID: Abby Chisholm is a 49 y.o. female who presents for FUV.    HPI  Patient in for a visit  Has lost 21 lbs  with DR Kma's diet   Has been going to the gym both cardio and weight lifting     Review of Systems  General: Denies fever, chills, night sweats,  Eyes: Negative for recent visual changes  Ears, Nose, Throat :  Negative for hearing changes, sinus discomfort  Dermatologic: Negative for new skin conditions, rash  Respiratory: Negative for wheezing, shortness of breath, cough  Cardiovascular: Negative for chest pain, palpitations, or leg swelling  Gastrointestinal: Negative for nausea/vomiting, abdominal pain, changes in bowel habits  Genitourinary Negative for Urinary Incontinence  urgency , frequency, discomfort   Musculoskeletal: see hpi  Neurological: Negative for headaches, dizziness    Previous history  Past Medical History:   Diagnosis Date    Abnormal uterine bleeding     Acute pharyngitis, unspecified 01/14/2022    Sore throat    Anxiety disorder, unspecified 07/28/2021    Anxiety    Arthritis     left knee arthritis    Class 3 severe obesity with body mass index (BMI) of 40.0 to 44.9 in adult 03/08/2024    42.83 kg/m²    Contusion of other part of head, initial encounter 10/24/2017    Forehead contusion    Cough, unspecified 01/14/2022    Cough    COVID-19     COVID-19 virus infection    Dorsalgia, unspecified 05/13/2016    Back pain    Edema, unspecified 11/22/2017    Edema    Elevated blood-pressure reading, without diagnosis of hypertension 02/21/2022    Blood pressure elevated without history of HTN    Encounter for immunization 09/21/2020    Need for tetanus booster    Encounter for immunization 09/24/2021    Flu vaccine need    Encounter for other screening for malignant neoplasm of breast 12/16/2022    Breast screening    Encounter for screening for malignant neoplasm of colon 10/08/2021    Colon cancer screening    Epigastric pain 03/31/2016    Epigastric pain     Essential (primary) hypertension 07/15/2022    Benign hypertension    GERD (gastroesophageal reflux disease)     Hypertension     Hypokalemia 05/13/2016    Hypokalemia    Nasal congestion 03/21/2022    Sinus congestion    Other abnormal and inconclusive findings on diagnostic imaging of breast 10/12/2020    Abnormal mammogram    Other fatigue 12/17/2021    Fatigue    Other lesions of oral mucosa 07/19/2016    Mouth sore    Other specified disorders of nose and nasal sinuses 01/14/2022    Stuffy and runny nose    Ovarian cyst     Ovarian cyst    Overweight 10/18/2019    Overweight    Pain in left arm 02/21/2022    Pain of left upper extremity    Pain in right toe(s) 09/24/2021    Toe pain, right    Pain, unspecified 02/21/2022    Aches    Somnolence 12/17/2021    Daytime sleepiness    Unspecified abdominal pain 03/31/2016    Abdominal pain    Unspecified fall, initial encounter 10/24/2017    Fall at home    Uterine leiomyoma     Viral intestinal infection, unspecified 04/20/2022    Viral gastroenteritis    Vitamin D deficiency, unspecified 03/21/2022    Vitamin D deficiency     Past Surgical History:   Procedure Laterality Date    APPENDECTOMY  09/08/2014    Appendectomy    DILATION AND CURETTAGE OF UTERUS  09/08/2014    Dilation And Curettage    ENDOMETRIAL ABLATION N/A 03/20/2024    Hydrothermal endometrial ablation    MOUTH SURGERY  09/08/2014    Oral Surgery Tooth Extraction    OTHER SURGICAL HISTORY  11/29/2018    Knee surgery     Social History     Tobacco Use    Smoking status: Never    Smokeless tobacco: Never   Vaping Use    Vaping status: Never Used    Passive vaping exposure: Yes   Substance Use Topics    Alcohol use: Yes     Alcohol/week: 2.0 standard drinks of alcohol     Types: 2 Glasses of wine per week    Drug use: Never     Family History   Problem Relation Name Age of Onset    Thyroid cancer Mother      Heart disease Father      Other (eating do) Sister      Breast cancer Paternal Grandmother  70      Allergies   Allergen Reactions    Latex, Natural Rubber Rash and Shortness of breath    Tree Nuts Anaphylaxis    Hydrocodone-Acetaminophen Hives    Levaquin [Levofloxacin] Swelling and Dry mouth    Topamax [Topiramate] Agitation     INTOLERANCE not an actual allergy    Lorcet (Propoxyphene) Itching and Rash     Sore throat     Current Outpatient Medications   Medication Instructions    azelastine (Astelin) 137 mcg (0.1 %) nasal spray 1 spray, Each Nostril, 2 times daily, Use in each nostril as directed    B complex-vitamin C tablet 1 tablet, Daily    cholecalciferol, vitamin D3, (VITAMIN D3 ORAL) Take by mouth.    hydroCHLOROthiazide (HYDRODIURIL) 25 mg, oral, Daily    hydroCHLOROthiazide (MICROZIDE) 12.5 mg, oral, Every 24 hours    ibuprofen 600 mg, oral, Every 6 hours    latanoprost (Xalatan) 0.005 % ophthalmic solution 1 drop, Both Eyes, Nightly    MAGNESIUM ORAL Take by mouth.    metFORMIN (GLUCOPHAGE) 500 mg, oral, 2 times daily (morning and late afternoon)    multivitamin tablet 1 tablet, Daily    norethindrone (MICRONOR) 0.35 mg, oral, Daily    potassium chloride CR 10 mEq ER tablet 10 mEq, oral, 2 times daily, Do not crush, chew, or split.    turmeric 400 mg capsule Take by mouth.       Objective       Physical Exam  Vital Signs: as recorded above  General: Well groomed, well nourished   Orientation:  Alert , oriented to time, place , and person   Mood and Affect:  Cooperative , no apparent distress normal affect  Skin: Good color, good turgor  Eyes: Extra ocular muscle movements intact, anicteric sclerae  Neck: Supple, full range of movement  Chest: Normal breath sounds, normal chest wall exam, symmetric, good air entry, clear to auscultation  Heart: Regular rate and rhythm, without murmur, gallop, or rubs  BACK:  no CTLS spine tenderness, no flank tenderness  Extremities: full range of movement  bilateral UE and bilateral LE,  no lower extremity edema  Neurological: Alert, oriented, cranial nerves  II-XII grossly intact except for visual acuity  Sensation:  Intact   Gait: normal steady      Assessment/Plan   Abby Chisholm is a 49 y.o. female who presents for the concerns below:    Problem List Items Addressed This Visit    None           Discussed with:   Return in : 4 months     Portions of this note were generated using digital voice recognition software, and may contain grammatical errors       Camila Ndiaye MD  01/24/25  2:34 PM

## 2025-01-24 NOTE — PATIENT INSTRUCTIONS
Please call our  [phone] to assist with scheduling   For the Calcium score = Cardiac CT     Counter check with the [facility] for the price with

## 2025-01-26 DIAGNOSIS — E88.810 DYSMETABOLIC SYNDROME: ICD-10-CM

## 2025-01-26 RX ORDER — POTASSIUM CHLORIDE 750 MG/1
TABLET, EXTENDED RELEASE ORAL
Qty: 180 TABLET | Refills: 1 | Status: SHIPPED | OUTPATIENT
Start: 2025-01-26

## 2025-01-28 ENCOUNTER — TELEPHONE (OUTPATIENT)
Dept: OBSTETRICS AND GYNECOLOGY | Facility: CLINIC | Age: 50
End: 2025-01-28
Payer: COMMERCIAL

## 2025-01-30 ENCOUNTER — OFFICE VISIT (OUTPATIENT)
Dept: OBSTETRICS AND GYNECOLOGY | Facility: CLINIC | Age: 50
End: 2025-01-30
Payer: COMMERCIAL

## 2025-01-30 VITALS
BODY MASS INDEX: 40.02 KG/M2 | DIASTOLIC BLOOD PRESSURE: 70 MMHG | SYSTOLIC BLOOD PRESSURE: 122 MMHG | HEIGHT: 61 IN | WEIGHT: 212 LBS

## 2025-01-30 DIAGNOSIS — N93.9 ABNORMAL UTERINE BLEEDING (AUB): Primary | ICD-10-CM

## 2025-01-30 PROCEDURE — 3008F BODY MASS INDEX DOCD: CPT | Performed by: NURSE PRACTITIONER

## 2025-01-30 PROCEDURE — 3074F SYST BP LT 130 MM HG: CPT | Performed by: NURSE PRACTITIONER

## 2025-01-30 PROCEDURE — 1036F TOBACCO NON-USER: CPT | Performed by: NURSE PRACTITIONER

## 2025-01-30 PROCEDURE — 99213 OFFICE O/P EST LOW 20 MIN: CPT | Performed by: NURSE PRACTITIONER

## 2025-01-30 PROCEDURE — 3078F DIAST BP <80 MM HG: CPT | Performed by: NURSE PRACTITIONER

## 2025-01-30 NOTE — PROGRESS NOTES
"Chief Complaint    Abnormal Uterine Bleeding        HPI    BLEEDING FOR 19 DAYS   Last edited by Irving Londono MA on 1/30/2025  2:15 PM.         49 y.o. G0 female who presents for evaluation of abnormal bleeding.  H/O endometrial ablation and salpingectomy in 07/2020 with Dr. Mejia.   Issues with continued AUB. Did not want to pursue hysterectomy. Underwent repeat hysteroscopy, ablation again in 03/2024.   Followed up with Dr. Mejia in 09/2024: On progesterone only pill for one month after ablation. After that, having cycles, but they were lighter, still fairly predictable. Had cycle May, June, and in July cycle was late. Bled on and off all through August, brownish spotting. She was restarted on Norethindrone 0.35 daily.   I saw patient for annual in 12/2024. Periods irregular on the pill, lasting 5-9 days.   Reports AUB for x19 days starting on 01/12. Heavy bleeding, gushing and soaking through protection. Flow lightened but then started getting heavy again with more clots on 01/22. Flow is light again.   States she did start Tumeric supplement this month and was recently started on Lipitor as well. She has lost ~20 lbs on the [facility] diet with Dr. Kam.   Denies significant pelvic pain. Known h/o uterine fibroids.   Negative STD testing 12/2024.  She is non-smoker  PMH: Anxiety, arthritis, HTN  Family h/o breast cancer: PGM  Family h/o GYN cancer: None  Family h/o colon cancer: None    /70   Ht 1.549 m (5' 1\")   Wt 96.2 kg (212 lb)   LMP 01/12/2025   BMI 40.06 kg/m²      Current Outpatient Medications   Medication Instructions    atorvastatin (LIPITOR) 10 mg, oral, Daily    azelastine (Astelin) 137 mcg (0.1 %) nasal spray 1 spray, Each Nostril, 2 times daily, Use in each nostril as directed    B complex-vitamin C tablet 1 tablet, Daily    cholecalciferol, vitamin D3, (VITAMIN D3 ORAL) Take by mouth.    hydroCHLOROthiazide (HYDRODIURIL) 25 mg, oral, Daily    hydroCHLOROthiazide (MICROZIDE) 12.5 mg, " oral, Every 24 hours    ibuprofen 600 mg, oral, Every 6 hours    latanoprost (Xalatan) 0.005 % ophthalmic solution 1 drop, Both Eyes, Nightly    MAGNESIUM ORAL Take by mouth.    metFORMIN (GLUCOPHAGE) 500 mg, oral, 2 times daily (morning and late afternoon)    multivitamin tablet 1 tablet, Daily    norethindrone (MICRONOR) 0.35 mg, oral, Daily    potassium chloride CR 10 mEq ER tablet TAKE 1 TABLET (10 MEQ) BY MOUTH 2 TIMES A DAY. DO NOT CRUSH, CHEW, OR SPLIT.    turmeric 400 mg capsule Take by mouth.        Review of Systems   Genitourinary:  Positive for menstrual problem and vaginal bleeding. Negative for pelvic pain.   All other systems reviewed and are negative.       Physical Exam  Constitutional:       Appearance: Normal appearance.   HENT:      Head: Normocephalic.      Nose: Nose normal.   Pulmonary:      Effort: Pulmonary effort is normal.   Genitourinary:     General: Normal vulva.      Vagina: Bleeding (Light) present.      Cervix: Normal.      Uterus: Normal.       Adnexa: Right adnexa normal and left adnexa normal.   Musculoskeletal:         General: Normal range of motion.      Cervical back: Normal range of motion and neck supple.   Neurological:      Mental Status: She is alert.   Psychiatric:         Mood and Affect: Mood normal.         Behavior: Behavior normal.          Assessment/Plan:  1. Abnormal uterine bleeding (AUB) (Primary)  -Continued AUB s/p ablation on daily Norethindrone.  -Does not want to pursue definitive surgical management at this tie.   -H/O HTN, discussed progesterone only options for hormonal management including POPs, Depo-Provera, IUD, Nexplanon.  -At this time she would like to try a different POP.  -Rx sent:  - drospirenone, contraceptive, 4 mg (28) tablet; Take 1 tablet by mouth once daily.  Dispense: 90 tablet; Refill: 3   -Follow up if no improvement or for any worsening symptoms.

## 2025-02-06 ENCOUNTER — HOSPITAL ENCOUNTER (OUTPATIENT)
Dept: RADIOLOGY | Facility: HOSPITAL | Age: 50
Discharge: HOME | End: 2025-02-06
Payer: COMMERCIAL

## 2025-02-06 DIAGNOSIS — H34.211 HOLLENHORST PLAQUE, RIGHT EYE: ICD-10-CM

## 2025-02-06 DIAGNOSIS — E78.00 ELEVATED LDL CHOLESTEROL LEVEL: ICD-10-CM

## 2025-02-06 PROCEDURE — 75571 CT HRT W/O DYE W/CA TEST: CPT

## 2025-03-17 ENCOUNTER — APPOINTMENT (OUTPATIENT)
Dept: OPHTHALMOLOGY | Facility: CLINIC | Age: 50
End: 2025-03-17
Payer: COMMERCIAL

## 2025-03-17 ENCOUNTER — APPOINTMENT (OUTPATIENT)
Dept: ENDOCRINOLOGY | Facility: CLINIC | Age: 50
End: 2025-03-17
Payer: COMMERCIAL

## 2025-03-17 DIAGNOSIS — H40.053 BILATERAL OCULAR HYPERTENSION: Primary | ICD-10-CM

## 2025-03-17 DIAGNOSIS — H40.001 GLAUCOMA SUSPECT OF RIGHT EYE: ICD-10-CM

## 2025-03-17 DIAGNOSIS — H40.002 GLAUCOMA SUSPECT OF LEFT EYE: ICD-10-CM

## 2025-03-17 PROCEDURE — 99214 OFFICE O/P EST MOD 30 MIN: CPT | Performed by: OPHTHALMOLOGY

## 2025-03-17 PROCEDURE — G2211 COMPLEX E/M VISIT ADD ON: HCPCS | Performed by: OPHTHALMOLOGY

## 2025-03-17 ASSESSMENT — CONF VISUAL FIELD
OS_SUPERIOR_NASAL_RESTRICTION: 0
OD_NORMAL: 1
OS_INFERIOR_NASAL_RESTRICTION: 0
OD_SUPERIOR_TEMPORAL_RESTRICTION: 0
OS_SUPERIOR_TEMPORAL_RESTRICTION: 0
OD_SUPERIOR_NASAL_RESTRICTION: 0
OS_NORMAL: 1
OD_INFERIOR_TEMPORAL_RESTRICTION: 0
OS_INFERIOR_TEMPORAL_RESTRICTION: 0
OD_INFERIOR_NASAL_RESTRICTION: 0

## 2025-03-17 ASSESSMENT — ENCOUNTER SYMPTOMS
CARDIOVASCULAR NEGATIVE: 0
CONSTITUTIONAL NEGATIVE: 0
NEUROLOGICAL NEGATIVE: 0
EYES NEGATIVE: 1
RESPIRATORY NEGATIVE: 0
PSYCHIATRIC NEGATIVE: 0
ENDOCRINE NEGATIVE: 0
HEMATOLOGIC/LYMPHATIC NEGATIVE: 0
MUSCULOSKELETAL NEGATIVE: 0
GASTROINTESTINAL NEGATIVE: 0
ALLERGIC/IMMUNOLOGIC NEGATIVE: 0

## 2025-03-17 ASSESSMENT — VISUAL ACUITY
OD_CC: 20/20
METHOD: SNELLEN - LINEAR
CORRECTION_TYPE: GLASSES
OS_CC: 20/20

## 2025-03-17 ASSESSMENT — EXTERNAL EXAM - RIGHT EYE: OD_EXAM: NORMAL

## 2025-03-17 ASSESSMENT — SLIT LAMP EXAM - LIDS
COMMENTS: NORMAL
COMMENTS: NORMAL

## 2025-03-17 ASSESSMENT — CUP TO DISC RATIO
OD_RATIO: 0.45
OS_RATIO: 0.3

## 2025-03-17 ASSESSMENT — PACHYMETRY
OD_CT(UM): 585
OS_CT(UM): 571
EXAM_DATE: 4/11/2024

## 2025-03-17 ASSESSMENT — EXTERNAL EXAM - LEFT EYE: OS_EXAM: NORMAL

## 2025-03-17 ASSESSMENT — TONOMETRY
IOP_METHOD: GOLDMANN APPLANATION
OD_IOP_MMHG: 27
OS_IOP_MMHG: 27

## 2025-03-17 NOTE — PROGRESS NOTES
History    Chief Complaint    Glaucoma Suspect       HPI       Glaucoma Suspect    Side effects of treatment include none.  Treatment compliance is misses drops infrequently.             Comments    49 Year old female presents for dilated exam. She states near vision is worsening. Pt denies eye pain. She is using latanoprost qhs OU when she remembers, she states she travels and sometimes misses her drops. She last took it last Wednesday.           Last edited by MARE Guardado on 3/17/2025 12:57 PM.        Problem List  Date Reviewed: 4/11/2024      Elevated blood pressure reading    Anxiety    Bacterial vaginitis    Benign hypertension    Blood pressure elevated without history of HTN    Breast lump on left side at 10 o'clock position    Burn    Cough    Snoring    COVID-19 virus infection    Daytime sleepiness    Dysmetabolic syndrome    Edema    Epigastric pain    Esophageal reflux    Fall at home    Fatigue    Forehead contusion    Headache    Hypokalemia    Right knee pain    Left knee pain    Medial meniscus tear    Menorrhagia    Mouth sore    Obesity    Ovarian cyst    Overweight    Pain of left upper extremity    Primary osteoarthritis of left knee    Sinus congestion    Sore throat    Shoulder pain, right    Stuffy and runny nose    Abnormal uterine bleeding (AUB)    Uterine leiomyoma    PONV (postoperative nausea and vomiting)       Past Medical History:   Diagnosis Date    Abnormal uterine bleeding     Acute pharyngitis, unspecified 01/14/2022    Sore throat    Anxiety disorder, unspecified 07/28/2021    Anxiety    Arthritis     left knee arthritis    Class 3 severe obesity with body mass index (BMI) of 40.0 to 44.9 in adult 03/08/2024    42.83 kg/m²    Contusion of other part of head, initial encounter 10/24/2017    Forehead contusion    Cough, unspecified 01/14/2022    Cough    COVID-19     COVID-19 virus infection    Dorsalgia, unspecified 05/13/2016    Back pain    Edema, unspecified  11/22/2017    Edema    Elevated blood-pressure reading, without diagnosis of hypertension 02/21/2022    Blood pressure elevated without history of HTN    Encounter for immunization 09/21/2020    Need for tetanus booster    Encounter for immunization 09/24/2021    Flu vaccine need    Encounter for other screening for malignant neoplasm of breast 12/16/2022    Breast screening    Encounter for screening for malignant neoplasm of colon 10/08/2021    Colon cancer screening    Epigastric pain 03/31/2016    Epigastric pain    Essential (primary) hypertension 07/15/2022    Benign hypertension    GERD (gastroesophageal reflux disease)     Hypertension     Hypokalemia 05/13/2016    Hypokalemia    Nasal congestion 03/21/2022    Sinus congestion    Other abnormal and inconclusive findings on diagnostic imaging of breast 10/12/2020    Abnormal mammogram    Other fatigue 12/17/2021    Fatigue    Other lesions of oral mucosa 07/19/2016    Mouth sore    Other specified disorders of nose and nasal sinuses 01/14/2022    Stuffy and runny nose    Ovarian cyst     Ovarian cyst    Overweight 10/18/2019    Overweight    Pain in left arm 02/21/2022    Pain of left upper extremity    Pain in right toe(s) 09/24/2021    Toe pain, right    Pain, unspecified 02/21/2022    Aches    Somnolence 12/17/2021    Daytime sleepiness    Unspecified abdominal pain 03/31/2016    Abdominal pain    Unspecified fall, initial encounter 10/24/2017    Fall at home    Uterine leiomyoma     Viral intestinal infection, unspecified 04/20/2022    Viral gastroenteritis    Vitamin D deficiency, unspecified 03/21/2022    Vitamin D deficiency     Past Surgical History:   Procedure Laterality Date    APPENDECTOMY  09/08/2014    Appendectomy    DILATION AND CURETTAGE OF UTERUS  09/08/2014    Dilation And Curettage    ENDOMETRIAL ABLATION N/A 03/20/2024    Hydrothermal endometrial ablation    MOUTH SURGERY  09/08/2014    Oral Surgery Tooth Extraction    OTHER SURGICAL  HISTORY  11/29/2018    Knee surgery     SOCIAL HISTORY   SMOKING:  reports that she has never smoked. She has never used smokeless tobacco.  DRUG USE:    reports no history of drug use.    FAMILY HISTORY  family history includes Breast cancer (age of onset: 70) in her paternal grandmother; Heart disease in her father; Thyroid cancer in her mother; eating do in her sister.    CURRENT MEDICATIONS  Current Outpatient Medications (Ophthalmology pharm classes)   Medication Sig Dispense Refill    latanoprost (Xalatan) 0.005 % ophthalmic solution Administer 1 drop into both eyes once daily at bedtime. 7.5 mL 3     Current Outpatient Medications (Other)   Medication Sig Dispense Refill    atorvastatin (Lipitor) 10 mg tablet Take 1 tablet (10 mg) by mouth once daily. 60 tablet 0    azelastine (Astelin) 137 mcg (0.1 %) nasal spray Administer 1 spray into each nostril 2 times a day. Use in each nostril as directed 30 mL 12    B complex-vitamin C tablet Take 1 tablet by mouth once daily.      cholecalciferol, vitamin D3, (VITAMIN D3 ORAL) Take by mouth.      drospirenone, contraceptive, 4 mg (28) tablet Take 1 tablet by mouth once daily. 90 tablet 3    hydroCHLOROthiazide (HYDRODiuril) 25 mg tablet TAKE 1 TABLET BY MOUTH EVERY DAY 90 tablet 1    hydroCHLOROthiazide (Microzide) 12.5 mg capsule TAKE 1 CAPSULE (12.5 MG) BY MOUTH ONCE EVERY 24 HOURS. 90 capsule 1    ibuprofen 200 mg tablet Take 3 tablets (600 mg) by mouth every 6 hours.      MAGNESIUM ORAL Take by mouth.      metFORMIN (Glucophage) 500 mg tablet TAKE 1 TABLET (500 MG) BY MOUTH 2 TIMES DAILY (MORNING AND LATE AFTERNOON). 180 tablet 1    multivitamin tablet Take 1 tablet by mouth once daily.      potassium chloride CR 10 mEq ER tablet TAKE 1 TABLET (10 MEQ) BY MOUTH 2 TIMES A DAY. DO NOT CRUSH, CHEW, OR SPLIT. 180 tablet 1    turmeric 400 mg capsule Take by mouth.         Exam   Visual Acuity (Snellen - Linear)         Right Left    Dist cc 20/20 20/20       "Correction: Glasses              Edited by: Emperatriz Puga, COT              Not recorded       Not recorded       Pupils       Pupils         Pupils    Right PERRL, No APD    Left PERRL, No APD                  Intraocular pressure was 21 in the right eye and 21 in the left eye using Goldmann Applanation.  Extraocular Movement       Extraocular Movement         Right Left     Full Full                  Not recorded       Not recorded        External Exam         Right Left    External Normal Normal              Slit Lamp Exam         Right Left    Lids/Lashes Normal Normal    Conjunctiva/Sclera White and quiet White and quiet    Cornea Clear Clear    Anterior Chamber Deep and quiet Deep and quiet    Iris Round and reactive Round and reactive    Lens Clear Clear    Anterior Vitreous Normal Normal                   <div id=\"[email or web address]\"></div>       Diagnostics              Plan   -  There were no encounter diagnoses.  -  Referred By:  No ref. provider found  -  IOP:  Last Tonometry OD 21 / OS 21 /Date 12:59 PM      Target OD: No Value exists for the : EPIC#ZBO802 Target OS: No Value exists for the : EPIC#QLG233       Max pressure OD:   Date:         Max Pressure OS:   Date:    -    Not recorded         -    Not recorded       -  Pathophysiology of glaucoma, and potential blinding nature of disease reviewed.      Importance of follow up and compliance stressed  -ocular htn glaucoma suspect based on possible elevated IOP at outside provider  -testing today is reassuring, overall patient is currently low risk  -IOP is up to 27 but patient admits non-compliance  -rediscussed d/w patient options of, SLT, or resume drop therapy  -she wishes to use drops, importance of compliance and strategies for compliance stressed  Restart latanoprost qhs OU  Rtc 3-4 months for IOP check. HVF 24-2    No Hollenhorst plaque noted today      "

## 2025-03-25 VITALS — BODY MASS INDEX: 36.01 KG/M2 | WEIGHT: 190.6 LBS

## 2025-03-25 NOTE — PROGRESS NOTES
Patient Discussion/Summary:   Met with pt for MNT discussing the [facility] diet. Patient has been on the diet back in  so she is familiar with the protocol. Started on 24 and is losing weight at a good pace. Not in ketosis. Denies constipation issues. She does not regularly exercise but does hold a gym membership. 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 As she does travel often for work, eating out will be a regular part of her routine. Continues taking required supplements. All questions answered.    DIET RECALL:  B: 2 eggs + SF yogurt  L: protein + veggie combo  D: protein + veggie combo  Snacks: string cheese  Drinks: Coke Zero, water    Wt Readings from Last 3 Encounters:   25 86.5 kg (190 lb 9.6 oz)   25 96.2 kg (212 lb)   25 95.3 kg (210 lb)     Lab Results   Component Value Date    HGBA1C 5.5 2024      Potassium   Date Value Ref Range Status   2024 3.7 3.5 - 5.3 mmol/L Final      Glucose   Date Value Ref Range Status   2024 95 74 - 99 mg/dL Final       Calculations:    IBW: Patient weight not recorded   Protein need calculated (1.5g/kg IBW) = 72g  Protein intake recommended: 77g = 11oz     Daily Meal Plan:   Mornin oz protein          Afternoon:  5 oz protein + 1 vegetable from the list   Evenin oz protein + 1 vegetable from the list   Fluids:        64-96oz per day     Recommended supplements:  Potassium as directed with breakfast and dinner (prescription)  Fiber capsules 1-2 times daily or sugar-free fiber powder (as tolerated)   Colace (stool softener) as needed, up to 4 capsules/day  Calcium: 500-600 mg, pill form twice daily with breakfast or lunch AND dinner  Multivitamin tablet or capsule per day that is low in iron (i.e. Centrum Silver)  Magnesium: 200-250 mg, pill form with dinner or at bedtime  Bouillon cube one to two times per day as needed, if feeling dizzy   Beginning with the fourth morning of this diet, test urine  every morning using Ketostix for ketone (fat) breakdown.

## 2025-04-11 ENCOUNTER — PATIENT MESSAGE (OUTPATIENT)
Dept: PRIMARY CARE | Facility: CLINIC | Age: 50
End: 2025-04-11
Payer: COMMERCIAL

## 2025-04-11 DIAGNOSIS — Z13.6 SCREENING FOR CARDIOVASCULAR CONDITION: ICD-10-CM

## 2025-04-11 DIAGNOSIS — E66.01 CLASS 3 SEVERE OBESITY WITH BODY MASS INDEX (BMI) OF 40.0 TO 44.9 IN ADULT, UNSPECIFIED OBESITY TYPE, UNSPECIFIED WHETHER SERIOUS COMORBIDITY PRESENT: ICD-10-CM

## 2025-04-11 DIAGNOSIS — Z13.0 SCREENING FOR ENDOCRINE, METABOLIC AND IMMUNITY DISORDER: Primary | ICD-10-CM

## 2025-04-11 DIAGNOSIS — Z13.228 SCREENING FOR ENDOCRINE, METABOLIC AND IMMUNITY DISORDER: Primary | ICD-10-CM

## 2025-04-11 DIAGNOSIS — R79.9 ABNORMAL BLOOD CHEMISTRY: ICD-10-CM

## 2025-04-11 DIAGNOSIS — Z13.29 SCREENING FOR ENDOCRINE, METABOLIC AND IMMUNITY DISORDER: Primary | ICD-10-CM

## 2025-04-11 DIAGNOSIS — E78.00 HYPERCHOLESTEROLEMIA: ICD-10-CM

## 2025-04-11 DIAGNOSIS — I10 BENIGN HYPERTENSION: ICD-10-CM

## 2025-04-11 DIAGNOSIS — E66.813 CLASS 3 SEVERE OBESITY WITH BODY MASS INDEX (BMI) OF 40.0 TO 44.9 IN ADULT, UNSPECIFIED OBESITY TYPE, UNSPECIFIED WHETHER SERIOUS COMORBIDITY PRESENT: ICD-10-CM

## 2025-04-22 LAB
ALBUMIN SERPL-MCNC: 4 G/DL (ref 3.6–5.1)
ALP SERPL-CCNC: 62 U/L (ref 31–125)
ALT SERPL-CCNC: 12 U/L (ref 6–29)
ANION GAP SERPL CALCULATED.4IONS-SCNC: 9 MMOL/L (CALC) (ref 7–17)
AST SERPL-CCNC: 13 U/L (ref 10–35)
BILIRUB SERPL-MCNC: 0.4 MG/DL (ref 0.2–1.2)
BUN SERPL-MCNC: 14 MG/DL (ref 7–25)
CALCIUM SERPL-MCNC: 9.2 MG/DL (ref 8.6–10.2)
CHLORIDE SERPL-SCNC: 105 MMOL/L (ref 98–110)
CHOLEST SERPL-MCNC: 158 MG/DL
CHOLEST/HDLC SERPL: 3.2 (CALC)
CO2 SERPL-SCNC: 24 MMOL/L (ref 20–32)
CREAT SERPL-MCNC: 0.63 MG/DL (ref 0.5–0.99)
EGFRCR SERPLBLD CKD-EPI 2021: 109 ML/MIN/1.73M2
EST. AVERAGE GLUCOSE BLD GHB EST-MCNC: 120 MG/DL
EST. AVERAGE GLUCOSE BLD GHB EST-SCNC: 6.6 MMOL/L
GLUCOSE SERPL-MCNC: 113 MG/DL (ref 65–99)
HBA1C MFR BLD: 5.8 %
HDLC SERPL-MCNC: 49 MG/DL
LDLC SERPL CALC-MCNC: 88 MG/DL (CALC)
NONHDLC SERPL-MCNC: 109 MG/DL (CALC)
POTASSIUM SERPL-SCNC: 4.1 MMOL/L (ref 3.5–5.3)
PROT SERPL-MCNC: 6.1 G/DL (ref 6.1–8.1)
SODIUM SERPL-SCNC: 138 MMOL/L (ref 135–146)
TRIGL SERPL-MCNC: 112 MG/DL
URATE SERPL-MCNC: 4.1 MG/DL (ref 2.5–7)

## 2025-04-24 ENCOUNTER — APPOINTMENT (OUTPATIENT)
Dept: ENDOCRINOLOGY | Facility: CLINIC | Age: 50
End: 2025-04-24
Payer: COMMERCIAL

## 2025-04-24 VITALS — BODY MASS INDEX: 34.77 KG/M2 | WEIGHT: 184 LBS

## 2025-04-24 DIAGNOSIS — I10 BENIGN HYPERTENSION: ICD-10-CM

## 2025-04-24 DIAGNOSIS — E88.810 DYSMETABOLIC SYNDROME: Primary | ICD-10-CM

## 2025-04-24 DIAGNOSIS — E78.5 HYPERLIPIDEMIA, UNSPECIFIED HYPERLIPIDEMIA TYPE: ICD-10-CM

## 2025-04-24 DIAGNOSIS — G47.33 OBSTRUCTIVE SLEEP APNEA: ICD-10-CM

## 2025-04-24 PROCEDURE — 99214 OFFICE O/P EST MOD 30 MIN: CPT | Performed by: INTERNAL MEDICINE

## 2025-04-24 NOTE — PROGRESS NOTES
Patient ID: Abby Chisholm is a 49 y.o. female who presents for No chief complaint on file..  HPI  The patient comes in for follow up.    She is on [facility] for management of insulin resistance with impaired fasting glucose prediabetes hypertension hyperlipidemia sleep apnea and edema.    She has had no difficulties with the program.    She has been able to stay on track despite a lot of travel.    Aside from the supplements she has had no change in her medical regimen.    She has been exercising intermittently.    Physically she has no complaints.    ROS  Comprehensive review of systems is negative.    Objective   Physical Exam  There were no vitals taken for this visit.   Vitals:    04/24/25 1213   Weight: 83.5 kg (184 lb)      Body mass index is 34.77 kg/m².      Weight 184 down 47 pounds    Virtual  Current Medications[1]    Assessment/Plan     1.  Insulin resistance with impaired fasting glucose  2.  Hypertension  3.  Hyperlipidemia  4.  Sleep apnea  5.  Edema    We reviewed her blood work from 3 days ago.    She will continue current regimen.    She will continue working on exercise.    She will make sure she keeps up with fluids.    She will continue on track in the program.  We discussed strategies for success. Planning ahead, taking things a week at a time and planning the week ahead of time.    Follow up with the nutritionist in 1 month, me in 2 months, sooner as needed.           [1]   Current Outpatient Medications   Medication Sig Dispense Refill    atorvastatin (Lipitor) 10 mg tablet Take 1 tablet (10 mg) by mouth once daily. 60 tablet 0    azelastine (Astelin) 137 mcg (0.1 %) nasal spray Administer 1 spray into each nostril 2 times a day. Use in each nostril as directed 30 mL 12    B complex-vitamin C tablet Take 1 tablet by mouth once daily.      cholecalciferol, vitamin D3, (VITAMIN D3 ORAL) Take by mouth.      drospirenone, contraceptive, 4 mg (28) tablet Take 1 tablet by mouth once daily. 90  tablet 3    hydroCHLOROthiazide (HYDRODiuril) 25 mg tablet TAKE 1 TABLET BY MOUTH EVERY DAY 90 tablet 1    hydroCHLOROthiazide (Microzide) 12.5 mg capsule TAKE 1 CAPSULE (12.5 MG) BY MOUTH ONCE EVERY 24 HOURS. 90 capsule 1    ibuprofen 200 mg tablet Take 3 tablets (600 mg) by mouth every 6 hours.      latanoprost (Xalatan) 0.005 % ophthalmic solution Administer 1 drop into both eyes once daily at bedtime. 7.5 mL 3    MAGNESIUM ORAL Take by mouth.      metFORMIN (Glucophage) 500 mg tablet TAKE 1 TABLET (500 MG) BY MOUTH 2 TIMES DAILY (MORNING AND LATE AFTERNOON). 180 tablet 1    multivitamin tablet Take 1 tablet by mouth once daily.      potassium chloride CR 10 mEq ER tablet TAKE 1 TABLET (10 MEQ) BY MOUTH 2 TIMES A DAY. DO NOT CRUSH, CHEW, OR SPLIT. 180 tablet 1    turmeric 400 mg capsule Take by mouth.       No current facility-administered medications for this visit.

## 2025-05-16 ENCOUNTER — APPOINTMENT (OUTPATIENT)
Dept: ENDOCRINOLOGY | Facility: CLINIC | Age: 50
End: 2025-05-16
Payer: COMMERCIAL

## 2025-05-19 ENCOUNTER — APPOINTMENT (OUTPATIENT)
Dept: PRIMARY CARE | Facility: CLINIC | Age: 50
End: 2025-05-19
Payer: COMMERCIAL

## 2025-05-19 VITALS — BODY MASS INDEX: 34.01 KG/M2 | WEIGHT: 180 LBS

## 2025-05-19 VITALS — BODY MASS INDEX: 34.39 KG/M2 | DIASTOLIC BLOOD PRESSURE: 84 MMHG | WEIGHT: 182 LBS | SYSTOLIC BLOOD PRESSURE: 126 MMHG

## 2025-05-19 DIAGNOSIS — Z13.29 SCREENING FOR ENDOCRINE, METABOLIC AND IMMUNITY DISORDER: ICD-10-CM

## 2025-05-19 DIAGNOSIS — Z13.228 SCREENING FOR ENDOCRINE, METABOLIC AND IMMUNITY DISORDER: ICD-10-CM

## 2025-05-19 DIAGNOSIS — Z13.0 SCREENING FOR ENDOCRINE, METABOLIC AND IMMUNITY DISORDER: ICD-10-CM

## 2025-05-19 DIAGNOSIS — R82.90 ABNORMAL URINE FINDINGS: ICD-10-CM

## 2025-05-19 DIAGNOSIS — R39.9 URINARY SYMPTOM OR SIGN: ICD-10-CM

## 2025-05-19 DIAGNOSIS — Z13.89 SCREENING FOR BLOOD OR PROTEIN IN URINE: ICD-10-CM

## 2025-05-19 DIAGNOSIS — R79.9 ABNORMAL BLOOD CHEMISTRY: Primary | ICD-10-CM

## 2025-05-19 DIAGNOSIS — E78.00 ELEVATED LDL CHOLESTEROL LEVEL: ICD-10-CM

## 2025-05-19 DIAGNOSIS — H34.211 HOLLENHORST PLAQUE, RIGHT EYE: ICD-10-CM

## 2025-05-19 DIAGNOSIS — E78.00 HYPERCHOLESTEROLEMIA: ICD-10-CM

## 2025-05-19 PROCEDURE — 3079F DIAST BP 80-89 MM HG: CPT | Performed by: INTERNAL MEDICINE

## 2025-05-19 PROCEDURE — 1036F TOBACCO NON-USER: CPT | Performed by: INTERNAL MEDICINE

## 2025-05-19 PROCEDURE — 3074F SYST BP LT 130 MM HG: CPT | Performed by: INTERNAL MEDICINE

## 2025-05-19 PROCEDURE — 99214 OFFICE O/P EST MOD 30 MIN: CPT | Performed by: INTERNAL MEDICINE

## 2025-05-19 NOTE — PROGRESS NOTES
Subjective   Patient ID: Abby Chisholm is a 49 y.o. female who presents for No chief complaint on file..    HPI  Patient in for a visit  Work trip to [place] ,   Lost 8 lbs , total 50 lbs since December   Doing Dr Rocha [facility] , talk to nutrition also   HDL will be starting exercise     Review of Systems  General: Denies fever, chills, night sweats,  Eyes: Negative for recent visual changes  Ears, Nose, Throat :  Negative for hearing changes, sinus discomfort  Dermatologic: Negative for new skin conditions, rash  Respiratory: Negative for wheezing, shortness of breath, cough  Cardiovascular: Negative for chest pain, palpitations, or leg swelling  Gastrointestinal: Negative for nausea/vomiting, abdominal pain, changes in bowel habits  Genitourinary Negative for Urinary Incontinence  urgency , frequency, discomfort   Musculoskeletal: see hpi  Neurological: Negative for headaches, dizziness    Previous history  Medical History[1]  Surgical History[2]  Social History[3]  Family History[4]  Allergies[5]  Current Outpatient Medications   Medication Instructions    atorvastatin (LIPITOR) 10 mg, oral, Daily    azelastine (Astelin) 137 mcg (0.1 %) nasal spray 1 spray, Each Nostril, 2 times daily, Use in each nostril as directed    B complex-vitamin C tablet 1 tablet, Daily    cholecalciferol, vitamin D3, (VITAMIN D3 ORAL) Take by mouth.    drospirenone, contraceptive, 4 mg (28) tablet 1 tablet, oral, Daily    hydroCHLOROthiazide (HYDRODIURIL) 25 mg, oral, Daily    hydroCHLOROthiazide (MICROZIDE) 12.5 mg, oral, Every 24 hours    ibuprofen 600 mg, oral, Every 6 hours    latanoprost (Xalatan) 0.005 % ophthalmic solution 1 drop, Both Eyes, Nightly    MAGNESIUM ORAL Take by mouth.    metFORMIN (GLUCOPHAGE) 500 mg, oral, 2 times daily (morning and late afternoon)    multivitamin tablet 1 tablet, Daily    potassium chloride CR 10 mEq ER tablet TAKE 1 TABLET (10 MEQ) BY MOUTH 2 TIMES A DAY. DO NOT CRUSH, CHEW, OR SPLIT.     turmeric 400 mg capsule Take by mouth.       Objective       Physical Exam  Vital Signs: as recorded above  General: Well groomed, well nourished   Orientation:  Alert , oriented to time, place , and person   Mood and Affect:  Cooperative , no apparent distress normal affect  Skin: Good color, good turgor  Eyes: Extra ocular muscle movements intact, anicteric sclerae  Neck: Supple, full range of movement  Chest: Normal breath sounds, normal chest wall exam, symmetric, good air entry, clear to auscultation  Heart: Regular rate and rhythm, without murmur, gallop, or rubs  BACK:  no CTLS spine tenderness, no flank tenderness  Extremities: full range of movement  bilateral UE and bilateral LE,  no lower extremity edema  Neurological: Alert, oriented, cranial nerves II-XII grossly intact except for visual acuity  Sensation:  Intact   Gait: normal steady      Assessment/Plan   Abby Chisholm is a 49 y.o. female who presents for the concerns below:    Problem List Items Addressed This Visit    None    HYPERCHOLESTEROLEMIA PLAN:  elevated not on medications  Follow low cholesterol diet, encouraged high omega 3 fatty acid intake in diet, exercise, weight control. . Will Obtain AST/ALT, fasting lipid profile if not done within past 3-6 months . Coenzyme Q10 200 mg a day if able to help increase HDL.    HYPERGLYCEMIA PLAN:Stable medications currently  , continue to follow Diabetic diet,  urine microalbumin utd , ophthalmology exam.  Need Podiatry checks periodically.    Obesity CONTINUE to lose weight seeing DR Kam in summer     Urinary problems will place order for ua and cx in case it worsens will be seeing gyne as well    HYPERTENSION PLAN:  , taking blood pressure medication  Follow low salt diet, exercise as discussed, weight control. Continue current medications         Discussed with:   Return in : 6 months labs to do prior to visit Dr Kam     Portions of this note were generated using digital voice  recognition software, and may contain grammatical errors       Camila Ndiaye MD  05/19/25  10:00 AM       [1]   Past Medical History:  Diagnosis Date    Abnormal uterine bleeding     Acute pharyngitis, unspecified 01/14/2022    Sore throat    Anxiety disorder, unspecified 07/28/2021    Anxiety    Arthritis     left knee arthritis    Class 3 severe obesity with body mass index (BMI) of 40.0 to 44.9 in adult 03/08/2024    42.83 kg/m²    Contusion of other part of head, initial encounter 10/24/2017    Forehead contusion    Cough, unspecified 01/14/2022    Cough    COVID-19     COVID-19 virus infection    Dorsalgia, unspecified 05/13/2016    Back pain    Edema, unspecified 11/22/2017    Edema    Elevated blood-pressure reading, without diagnosis of hypertension 02/21/2022    Blood pressure elevated without history of HTN    Encounter for immunization 09/21/2020    Need for tetanus booster    Encounter for immunization 09/24/2021    Flu vaccine need    Encounter for other screening for malignant neoplasm of breast 12/16/2022    Breast screening    Encounter for screening for malignant neoplasm of colon 10/08/2021    Colon cancer screening    Epigastric pain 03/31/2016    Epigastric pain    Essential (primary) hypertension 07/15/2022    Benign hypertension    GERD (gastroesophageal reflux disease)     Hypertension     Hypokalemia 05/13/2016    Hypokalemia    Nasal congestion 03/21/2022    Sinus congestion    Other abnormal and inconclusive findings on diagnostic imaging of breast 10/12/2020    Abnormal mammogram    Other fatigue 12/17/2021    Fatigue    Other lesions of oral mucosa 07/19/2016    Mouth sore    Other specified disorders of nose and nasal sinuses 01/14/2022    Stuffy and runny nose    Ovarian cyst     Ovarian cyst    Overweight 10/18/2019    Overweight    Pain in left arm 02/21/2022    Pain of left upper extremity    Pain in right toe(s) 09/24/2021    Toe pain, right    Pain, unspecified  02/21/2022    Aches    Somnolence 12/17/2021    Daytime sleepiness    Unspecified abdominal pain 03/31/2016    Abdominal pain    Unspecified fall, initial encounter 10/24/2017    Fall at home    Uterine leiomyoma     Viral intestinal infection, unspecified 04/20/2022    Viral gastroenteritis    Vitamin D deficiency, unspecified 03/21/2022    Vitamin D deficiency   [2]   Past Surgical History:  Procedure Laterality Date    APPENDECTOMY  09/08/2014    Appendectomy    DILATION AND CURETTAGE OF UTERUS  09/08/2014    Dilation And Curettage    ENDOMETRIAL ABLATION N/A 03/20/2024    Hydrothermal endometrial ablation    MOUTH SURGERY  09/08/2014    Oral Surgery Tooth Extraction    OTHER SURGICAL HISTORY  11/29/2018    Knee surgery   [3]   Social History  Tobacco Use    Smoking status: Never     Passive exposure: Never    Smokeless tobacco: Never   Vaping Use    Vaping status: Never Used    Passive vaping exposure: Yes   Substance Use Topics    Alcohol use: Yes     Alcohol/week: 2.0 standard drinks of alcohol     Types: 2 Glasses of wine per week     Comment: social    Drug use: Never   [4]   Family History  Problem Relation Name Age of Onset    Thyroid cancer Mother      Heart disease Father      Other (eating do) Sister      Breast cancer Paternal Grandmother  70   [5]   Allergies  Allergen Reactions    Latex, Natural Rubber Rash and Shortness of breath    Tree Nuts Anaphylaxis    Hydrocodone-Acetaminophen Hives    Levaquin [Levofloxacin] Swelling and Dry mouth    Topamax [Topiramate] Agitation     INTOLERANCE not an actual allergy    Lorcet (Propoxyphene) Itching and Rash     Sore throat

## 2025-05-19 NOTE — PROGRESS NOTES
Patient Discussion/Summary:   Met with pt for MNT discussing the [facility] diet. Patient has been successful in weight loss since starting on 24. She was ill last week, therefore was only eating comfort foods. Not in ketosis. Denies constipation issues. She does not regularly exercise but does hold a gym membership. 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 As she does travel often for work, eating out will be a regular part of her routine. We did discuss her recent labwork including A1C levels. Continues taking required supplements. All questions answered.    DIET RECALL:  B: 2 eggs + SF yogurt  L: protein + veggie combo  D: protein + veggie combo  Snacks: string cheese  Drinks: Coke Zero, water    Wt Readings from Last 5 Encounters:   25 82.6 kg (182 lb)   25 81.6 kg (180 lb)   25 83.5 kg (184 lb)   25 86.5 kg (190 lb 9.6 oz)   25 96.2 kg (212 lb)     Lab Results   Component Value Date    HGBA1C 5.8 (H) 2025      POTASSIUM   Date Value Ref Range Status   2025 4.1 3.5 - 5.3 mmol/L Final      GLUCOSE   Date Value Ref Range Status   2025 113 (H) 65 - 99 mg/dL Final     Comment:                   Fasting reference interval     For someone without known diabetes, a glucose value  between 100 and 125 mg/dL is consistent with  prediabetes and should be confirmed with a  follow-up test.            Calculations:    IBW: Ideal body weight: 47.8 kg (105 lb 6.1 oz)  Adjusted ideal body weight: 61.7 kg (136 lb 0.5 oz)   Protein need calculated (1.5g/kg IBW) = 72g  Protein intake recommended: 77g = 11oz     Daily Meal Plan:   Mornin oz protein          Afternoon:  5 oz protein + 1 vegetable from the list   Evenin oz protein + 1 vegetable from the list   Fluids:        64-96oz per day     Recommended supplements:  Potassium as directed with breakfast and dinner (prescription)  Fiber capsules 1-2 times daily or sugar-free fiber powder (as  tolerated)   Colace (stool softener) as needed, up to 4 capsules/day  Calcium: 500-600 mg, pill form twice daily with breakfast or lunch AND dinner  Multivitamin tablet or capsule per day that is low in iron (i.e. Centrum Silver)  Magnesium: 200-250 mg, pill form with dinner or at bedtime  Bouillon cube one to two times per day as needed, if feeling dizzy   Beginning with the fourth morning of this diet, test urine every morning using Ketostix for ketone (fat) breakdown.

## 2025-06-10 ENCOUNTER — APPOINTMENT (OUTPATIENT)
Dept: OBSTETRICS AND GYNECOLOGY | Facility: CLINIC | Age: 50
End: 2025-06-10
Payer: COMMERCIAL

## 2025-06-10 VITALS
SYSTOLIC BLOOD PRESSURE: 130 MMHG | BODY MASS INDEX: 33.99 KG/M2 | HEIGHT: 61 IN | WEIGHT: 180 LBS | DIASTOLIC BLOOD PRESSURE: 82 MMHG

## 2025-06-10 DIAGNOSIS — N93.9 ABNORMAL UTERINE BLEEDING (AUB): Primary | ICD-10-CM

## 2025-06-10 DIAGNOSIS — D25.9 UTERINE LEIOMYOMA, UNSPECIFIED LOCATION: ICD-10-CM

## 2025-06-10 DIAGNOSIS — R79.9 ABNORMAL BLOOD CHEMISTRY: ICD-10-CM

## 2025-06-10 DIAGNOSIS — N89.8 VAGINAL DISCHARGE: ICD-10-CM

## 2025-06-10 PROCEDURE — 3008F BODY MASS INDEX DOCD: CPT | Performed by: NURSE PRACTITIONER

## 2025-06-10 PROCEDURE — 3079F DIAST BP 80-89 MM HG: CPT | Performed by: NURSE PRACTITIONER

## 2025-06-10 PROCEDURE — 99214 OFFICE O/P EST MOD 30 MIN: CPT | Performed by: NURSE PRACTITIONER

## 2025-06-10 PROCEDURE — 3075F SYST BP GE 130 - 139MM HG: CPT | Performed by: NURSE PRACTITIONER

## 2025-06-10 RX ORDER — METFORMIN HYDROCHLORIDE 500 MG/1
500 TABLET ORAL
Qty: 180 TABLET | Refills: 0 | Status: SHIPPED | OUTPATIENT
Start: 2025-06-10

## 2025-06-10 ASSESSMENT — ENCOUNTER SYMPTOMS
FREQUENCY: 1
DYSURIA: 0

## 2025-06-10 NOTE — PROGRESS NOTES
"Chief Complaint    CONSULT ON BIRTH CONTROL        HPI    - HAS BEEN ON SLYND SINCE JAN 31ST BUT IS STILL HAVING IRREGULAR BLEEDING  - MARCH 2ND LEAKING URINE - DRIPS  Last edited by Irving Londono MA on 6/10/2025 11:24 AM.         49 y.o. G0 female who presents for evaluation of abnormal bleeding.    History as follows:  H/O uterine fibroids, had endometrial ablation and salpingectomy in 07/2020 with Dr. Mejia.   Issues with continued AUB. Did not want to pursue hysterectomy. Underwent repeat hysteroscopy, ablation again in 03/2024 with Dr. Mejia.   Followed up in 09/2024: On progesterone only pill for one month after ablation. After that, having cycles, but they were lighter, still fairly predictable. Had cycle May, June, and in July cycle was late. Bled on and off all through August, brownish spotting. She was restarted on Norethindrone 0.35 daily.   I saw patient for annual in 12/2024. Periods irregular on the pill, lasting 5-9 days.   AUB starting again in January this year. Heavy bleeding, gushing and soaking through protection while on Norethindrone 0.35 daily.     Switched to Slynd at the end of January.  AUB continuing after starting.   Bleeding more days than not.   Heavy flow at times with silver dollar clots.  She is still adamant about not wanting a hysterectomy. Considering going to [place] to see a uterine fibroid specialist. She would like to discuss possibility of having the uterine fibroids treated instead of definitive hysterectomy.     Also reports concerns about a \"leaking\".  Uncertain if it's urinary incontinence or vaginal discharge.   Describes as spontaneous dripping feeling.  Clear in color, yellow tinge.   No abnormal odor or itching.   Went to [facility] a few weeks ago concerned about UTI, was treated but urine culture negative and she also had negative STD testing.     She is non-smoker  PMH: Anxiety, arthritis, HTN  Family h/o breast cancer: PGM  Family h/o GYN cancer: " "None  Family h/o colon cancer: None    /82   Ht 1.549 m (5' 1\")   Wt 81.6 kg (180 lb)   LMP 05/31/2025   BMI 34.01 kg/m²      Current Outpatient Medications   Medication Instructions    atorvastatin (LIPITOR) 10 mg, oral, Daily    azelastine (Astelin) 137 mcg (0.1 %) nasal spray 1 spray, Each Nostril, 2 times daily, Use in each nostril as directed    B complex-vitamin C tablet 1 tablet, Daily    cholecalciferol, vitamin D3, (VITAMIN D3 ORAL) Take by mouth.    drospirenone, contraceptive, 4 mg (28) tablet 1 tablet, oral, Daily    hydroCHLOROthiazide (MICROZIDE) 12.5 mg, oral, Every 24 hours    ibuprofen 600 mg, oral, Every 6 hours    latanoprost (Xalatan) 0.005 % ophthalmic solution 1 drop, Both Eyes, Nightly    MAGNESIUM ORAL Take by mouth.    metFORMIN (GLUCOPHAGE) 500 mg, oral, 2 times daily (morning and late afternoon)    multivitamin tablet 1 tablet, Daily    potassium chloride CR 10 mEq ER tablet TAKE 1 TABLET (10 MEQ) BY MOUTH 2 TIMES A DAY. DO NOT CRUSH, CHEW, OR SPLIT.        Review of Systems   Genitourinary:  Positive for frequency, menstrual problem, vaginal bleeding and vaginal discharge. Negative for dysuria and pelvic pain.   All other systems reviewed and are negative.       Physical Exam  Constitutional:       Appearance: Normal appearance.   HENT:      Head: Normocephalic.      Nose: Nose normal.   Pulmonary:      Effort: Pulmonary effort is normal.   Genitourinary:     General: Normal vulva.      Vagina: Bleeding (Light) present.      Cervix: Normal.      Uterus: Enlarged.       Adnexa: Right adnexa normal and left adnexa normal.   Musculoskeletal:         General: Normal range of motion.      Cervical back: Normal range of motion and neck supple.   Skin:     General: Skin is warm and dry.   Neurological:      Mental Status: She is alert.   Psychiatric:         Mood and Affect: Mood normal.         Behavior: Behavior normal.          Assessment/Plan:  1. Abnormal uterine bleeding (AUB) " "(Primary)  2. Uterine leiomyoma, unspecified location  -Continued AUB s/p x2 endometrial ablations and currently on POPs.  -Options for medical management discussed including Depo-Provera, Nexplanon, IUD.  -Uterine fibroids do feel larger on exam than previously so repeat pelvic US ordered for further evaluation:  - US PELVIS TRANSABDOMINAL WITH TRANSVAGINAL; Future  -She would like to see specialist to discuss options for management of fibroids surgical vs medical, she does not want a hysterectomy.  -Referral to 's Dr. Mccormack placed for further input and management options:  - Referral to Gynecology; Future    3. Vaginal discharge  -Discussed urinary incontinence vs possible vaginal discharge contributing to \"leaking\" feeling.   -Vaginal culture collected:  - Vaginitis Gram Stain For Bacterial Vaginosis + Yeast   -Will notify of results.  -Discussed bulk symptoms of uterine fibroids should be considered which may also contribute to bladder issues.   -Plan of care for today, patient plans to discuss options with specialist and will follow up with me yearly and as needed.   "

## 2025-06-11 LAB — BV SCORE VAG QL: NORMAL

## 2025-06-13 ENCOUNTER — TELEPHONE (OUTPATIENT)
Dept: ORTHOPEDIC SURGERY | Facility: CLINIC | Age: 50
End: 2025-06-13
Payer: COMMERCIAL

## 2025-06-13 NOTE — TELEPHONE ENCOUNTER
Copied from CRM #[id number]. Topic: Information Request - Trying to reach PCP  >> Jun 12, 2025  3:50 PM Alena KIDD wrote:  Patient Ms. Chisholm is calling to schedule appt with Dr. Castorena for a cortisone injection. She goes to the [facility] office and there was no availability populated and she only prefers to that location. Please be advised, thank you.

## 2025-06-16 ENCOUNTER — HOSPITAL ENCOUNTER (OUTPATIENT)
Dept: RADIOLOGY | Facility: HOSPITAL | Age: 50
Discharge: HOME | End: 2025-06-16
Payer: COMMERCIAL

## 2025-06-16 ENCOUNTER — APPOINTMENT (OUTPATIENT)
Dept: RADIOLOGY | Facility: HOSPITAL | Age: 50
End: 2025-06-16
Payer: COMMERCIAL

## 2025-06-16 DIAGNOSIS — N93.9 ABNORMAL UTERINE BLEEDING (AUB): ICD-10-CM

## 2025-06-16 PROCEDURE — 76856 US EXAM PELVIC COMPLETE: CPT | Performed by: RADIOLOGY

## 2025-06-16 PROCEDURE — 76856 US EXAM PELVIC COMPLETE: CPT

## 2025-06-16 PROCEDURE — 76830 TRANSVAGINAL US NON-OB: CPT | Performed by: RADIOLOGY

## 2025-06-17 DIAGNOSIS — N93.9 ABNORMAL UTERINE BLEEDING (AUB): Primary | ICD-10-CM

## 2025-06-17 RX ORDER — MEDROXYPROGESTERONE ACETATE 10 MG/1
10 TABLET ORAL DAILY
Qty: 14 TABLET | Refills: 1 | Status: SHIPPED | OUTPATIENT
Start: 2025-06-17 | End: 2026-06-17

## 2025-06-18 DIAGNOSIS — H40.053 BILATERAL OCULAR HYPERTENSION: ICD-10-CM

## 2025-06-18 RX ORDER — LATANOPROST 50 UG/ML
1 SOLUTION/ DROPS OPHTHALMIC NIGHTLY
Qty: 7.5 ML | Refills: 3 | Status: SHIPPED | OUTPATIENT
Start: 2025-06-18 | End: 2026-06-18

## 2025-06-19 ENCOUNTER — APPOINTMENT (OUTPATIENT)
Dept: ENDOCRINOLOGY | Facility: CLINIC | Age: 50
End: 2025-06-19
Payer: COMMERCIAL

## 2025-06-19 DIAGNOSIS — Z13.228 SCREENING FOR ENDOCRINE, METABOLIC AND IMMUNITY DISORDER: ICD-10-CM

## 2025-06-19 DIAGNOSIS — Z13.0 SCREENING FOR ENDOCRINE, METABOLIC AND IMMUNITY DISORDER: ICD-10-CM

## 2025-06-19 DIAGNOSIS — R79.9 ABNORMAL BLOOD CHEMISTRY: ICD-10-CM

## 2025-06-19 DIAGNOSIS — E78.00 HYPERCHOLESTEROLEMIA: ICD-10-CM

## 2025-06-19 DIAGNOSIS — E78.00 ELEVATED LDL CHOLESTEROL LEVEL: ICD-10-CM

## 2025-06-19 DIAGNOSIS — Z13.29 SCREENING FOR ENDOCRINE, METABOLIC AND IMMUNITY DISORDER: ICD-10-CM

## 2025-06-27 ENCOUNTER — ANCILLARY PROCEDURE (OUTPATIENT)
Dept: VASCULAR MEDICINE | Facility: CLINIC | Age: 50
End: 2025-06-27
Payer: COMMERCIAL

## 2025-06-27 DIAGNOSIS — E78.00 HYPERCHOLESTEROLEMIA: ICD-10-CM

## 2025-06-27 DIAGNOSIS — H34.211 HOLLENHORST PLAQUE, RIGHT EYE: ICD-10-CM

## 2025-06-27 DIAGNOSIS — E78.00 ELEVATED LDL CHOLESTEROL LEVEL: ICD-10-CM

## 2025-06-27 DIAGNOSIS — H53.133 SUDDEN VISUAL LOSS, BILATERAL: ICD-10-CM

## 2025-06-27 PROCEDURE — 93880 EXTRACRANIAL BILAT STUDY: CPT

## 2025-06-27 PROCEDURE — 93880 EXTRACRANIAL BILAT STUDY: CPT | Performed by: SURGERY

## 2025-07-01 ENCOUNTER — APPOINTMENT (OUTPATIENT)
Dept: ORTHOPEDIC SURGERY | Facility: CLINIC | Age: 50
End: 2025-07-01
Payer: COMMERCIAL

## 2025-07-01 DIAGNOSIS — M17.0 BILATERAL PRIMARY OSTEOARTHRITIS OF KNEE: Primary | ICD-10-CM

## 2025-07-01 PROCEDURE — 20610 DRAIN/INJ JOINT/BURSA W/O US: CPT | Performed by: FAMILY MEDICINE

## 2025-07-01 PROCEDURE — 99213 OFFICE O/P EST LOW 20 MIN: CPT | Performed by: FAMILY MEDICINE

## 2025-07-01 PROCEDURE — 1036F TOBACCO NON-USER: CPT | Performed by: FAMILY MEDICINE

## 2025-07-01 RX ORDER — TRIAMCINOLONE ACETONIDE 40 MG/ML
40 INJECTION, SUSPENSION INTRA-ARTICULAR; INTRAMUSCULAR
Status: COMPLETED | OUTPATIENT
Start: 2025-07-01 | End: 2025-07-01

## 2025-07-01 RX ORDER — LIDOCAINE HYDROCHLORIDE 10 MG/ML
4 INJECTION, SOLUTION INFILTRATION; PERINEURAL
Status: COMPLETED | OUTPATIENT
Start: 2025-07-01 | End: 2025-07-01

## 2025-07-01 RX ADMIN — LIDOCAINE HYDROCHLORIDE 4 ML: 10 INJECTION, SOLUTION INFILTRATION; PERINEURAL at 15:32

## 2025-07-01 RX ADMIN — TRIAMCINOLONE ACETONIDE 40 MG: 40 INJECTION, SUSPENSION INTRA-ARTICULAR; INTRAMUSCULAR at 15:32

## 2025-07-01 NOTE — PROGRESS NOTES
** Please excuse any errors in grammar or translation related to this dictation. Voice recognition software was utilized to prepare this document. **    Assessment & Plan:  Patient here for ongoing management of bilateral knee arthritis, which is more advanced and symptomatic on left.   - Maintaining a healthy weight: Every pound of bodyweight is about 4-5 pounds through the lower extremity.  Applaud patient's weight loss while on PSMF diet.   - Exercise program to strengthen supportive musculature.  Advised on leg extensions, curls and press 3 sets of 10-12 reps.  Do not flex knee past 90 degrees when completing.   -OTC analgesics as needed.  Consider trial of turmeric 1000 mg daily as an anti-inflammatory.  - Steroid injection. Elected for completion today.  - Hyaluronic acid injection. Completed June 2024 in left knee was ineffective.  - Referral to pain management for potential nerve ablation.  - After failing non-operative measures, may ultimately require arthroplasty.  Informed patient that steroid injection can be repeated every 3 or more months as symptoms dictate.  Follow-up as needed for ongoing management. All questions answered and patient is agreeable to this plan.       Chief complaint:  Left knee pain, right knee pain    HPI:  7/1/25:  Patient follows up for bilateral knee pain. She reports the cortisone injections from 12/31/24 provided her good relief.  She has lost 50 pounds since the previous visit and feels that has greatly helped her knee pain.  She has been doing strength training as well.  Denies any new injuries. Overall knee symptoms are tolerable but leaves for vacation next week and wants to optimize pain control for that.     12/31/24:  Patient reports the left knee cortisone injection from 9/25 provided about 2 months relief.  She attributes exacerbation of pain after being on her feet for multiple hours a day during a work conference in [place] in November.  She also has newer less  severe right knee pain since August.  No mechanism of injury.  Tylenol and ibuprofen as needed gives her a small amount of relief.  Reports she recently met with endocrinologist Dr. Kam and started back on protein sparing modified fast.    9/25/24: Patient following up for left knee arthritis.  Patient reports the Durolane from 6/24 provided minimal relief.  She does note wearing compression stockings has alleviated some of her pain and plans to purchase a knee compression sleeve.    6/24/24: Patient following up for management of left knee arthritis.  Had steroid injection completed in March which did help to mitigate her pain for several weeks.  Unfortunately pain has subsequently returned.  She complains of constant pain at the medial and posterior knee.    3/5/24: Patient following up for left knee arthritis.  She was last seen in September and had a steroid injection completed at that time.  States show I received some mild relief following the injection which she attributes of working several long hours on her feet in the days immediately following.  Her knee pain is intermittent depending how much she is on her feet.  Denies any new injuries.  She states she is working with a dietitian in order to lose weight to promote her overall health and joint health.    9/12/24: Patient complains of left knee pain for approximately 3 years. Hx of left knee arthroscopy in 2017 for meniscal tear. Pain is localized to anterior knee. Swelling occurs intermittently. Most recently was being seen by Dr. Morrow for this condition. Last had CSI in April 2023 which helped reduce pain for 2 months. Had 2 previous injections prior to this. She uses ibuprofen, Tylenol arthritis, and voltaren gel as needed which give her a small amount of relief.       Patient Active Problem List   Diagnosis    Elevated blood pressure reading    Anxiety    Bacterial vaginitis    Benign hypertension    Blood pressure elevated without history of  HTN    Breast lump on left side at 10 o'clock position    Burn    Cough    Snoring    COVID-19 virus infection    Daytime sleepiness    Dysmetabolic syndrome    Edema    Epigastric pain    Esophageal reflux    Fall at home    Fatigue    Forehead contusion    Headache    Hypokalemia    Right knee pain    Left knee pain    Medial meniscus tear    Menorrhagia    Mouth sore    Obesity    Ovarian cyst    Overweight    Pain of left upper extremity    Primary osteoarthritis of left knee    Sinus congestion    Sore throat    Shoulder pain, right    Stuffy and runny nose    Abnormal uterine bleeding (AUB)    Uterine leiomyoma    PONV (postoperative nausea and vomiting)    Hyperlipidemia    Obstructive sleep apnea     Past Surgical History:   Procedure Laterality Date    APPENDECTOMY  09/08/2014    Appendectomy    DILATION AND CURETTAGE OF UTERUS  09/08/2014    Dilation And Curettage    ENDOMETRIAL ABLATION N/A 03/20/2024    Hydrothermal endometrial ablation    MOUTH SURGERY  09/08/2014    Oral Surgery Tooth Extraction    OTHER SURGICAL HISTORY  11/29/2018    Knee surgery     Current Outpatient Medications on File Prior to Visit   Medication Sig Dispense Refill    atorvastatin (Lipitor) 10 mg tablet Take 1 tablet (10 mg) by mouth once daily. 60 tablet 0    azelastine (Astelin) 137 mcg (0.1 %) nasal spray Administer 1 spray into each nostril 2 times a day. Use in each nostril as directed 30 mL 12    B complex-vitamin C tablet Take 1 tablet by mouth once daily.      cholecalciferol, vitamin D3, (VITAMIN D3 ORAL) Take by mouth.      drospirenone, contraceptive, 4 mg (28) tablet Take 1 tablet by mouth once daily. 90 tablet 3    hydroCHLOROthiazide (Microzide) 12.5 mg capsule TAKE 1 CAPSULE (12.5 MG) BY MOUTH ONCE EVERY 24 HOURS. 90 capsule 1    ibuprofen 200 mg tablet Take 3 tablets (600 mg) by mouth every 6 hours.      latanoprost (Xalatan) 0.005 % ophthalmic solution ADMINISTER 1 DROP INTO BOTH EYES ONCE DAILY AT BEDTIME. 7.5  mL 3    MAGNESIUM ORAL Take by mouth.      medroxyPROGESTERone (Provera) 10 mg tablet Take 1 tablet (10 mg) by mouth once daily. 14 tablet 1    metFORMIN (Glucophage) 500 mg tablet TAKE 1 TABLET (500 MG) BY MOUTH 2 TIMES DAILY (MORNING AND LATE AFTERNOON). 180 tablet 0    multivitamin tablet Take 1 tablet by mouth once daily.      potassium chloride CR 10 mEq ER tablet TAKE 1 TABLET (10 MEQ) BY MOUTH 2 TIMES A DAY. DO NOT CRUSH, CHEW, OR SPLIT. 180 tablet 1     No current facility-administered medications on file prior to visit.     Exam:  General Exam:  Constitutional - NAD, AAO x 3, conversing appropriately.  HEENT- Normocephalic and atraumatic. No facial deformities. Hearing grossly normal.  Lungs - Breathing non-labored with normal rate. No accessory muscle use.  CV - Extremities warm and well-perfused, brisk capillary refill present.   Neuro - CN II-XII grossly intact.    LEFT Knee examined. No effusion, ecchymosis, or erythema. AROM from 0 to 120 deg with 5/5 strength. SILT overlying knee. Motion crepitus present. Medial joint line tenderness.  Negative anterior and posterior drawer. No laxity to varus or valgus stress at 0 or 30 deg. No patellar apprehension.     RIGHT Knee examined. No effusion, ecchymosis, or erythema. AROM from 0 to 120 deg with 5/5 strength. SILT overlying knee. Motion crepitus present. No joint line tenderness. Negative anterior and posterior drawer. No laxity to varus or valgus stress at 0 or 30 deg. No patellar apprehension.          Results:  X-rays of bilateral knees obtained 12/27/2024 demonstrate advanced degenerative changes of the left knee with varus alignment and moderate degenerative change of the right knee with joint space narrowing, subchondral sclerosis, and osteophyte formation.      Lab Results   Component Value Date    HGBA1C 5.8 (H) 04/21/2025    CREATININE 0.63 04/21/2025    EGFR 109 04/21/2025     Procedure:  Patient ID: Abby Chisholm is a 49 y.o. female.    JUSTICE  Inj/Asp: bilateral knee on 7/1/2025 3:32 PM  Indications: pain  Details: 25 G needle, anterolateral approach  Medications (Right): 40 mg triamcinolone acetonide 40 mg/mL; 4 mL lidocaine 10 mg/mL (1 %)  Medications (Left): 40 mg triamcinolone acetonide 40 mg/mL; 4 mL lidocaine 10 mg/mL (1 %)  Outcome: tolerated well, no immediate complications    Procedure risk factors to include increased pain, bleeding, infection, neurovascular injury, soft tissue injury, progressive cartilage loss, transient elevation of blood glucose and blood pressure, and adverse reaction to medication were discussed with the patient. Patient understands there is a moderate risk of morbidity from undergoing the procedure.    Procedure, treatment alternatives, risks and benefits explained, specific risks discussed. Consent was given by the patient. Immediately prior to procedure a time out was called to verify the correct patient, procedure, equipment, support staff and site/side marked as required. Patient was prepped and draped in the usual sterile fashion.

## 2025-07-06 VITALS — BODY MASS INDEX: 33.25 KG/M2 | WEIGHT: 176 LBS

## 2025-07-06 NOTE — PROGRESS NOTES
Patient Discussion/Summary:   Met with pt for MNT discussing the [facility] diet. Patient has been successful in weight loss since starting on 24. Doing well so far, no complaints. She has stopped drinking Coke Zero and started increasing intake of infused water. Denies constipation issues. She does not regularly exercise but does hold a gym membership. 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 Typically does 10 mins of cardio and some weights. Continues taking required supplements. All questions answered.    DIET RECALL:  B: 2 eggs + SF yogurt  L: protein + veggie combo  D: protein + veggie combo  Snacks: string cheese  Drinks: Coke Zero, water    Wt Readings from Last 10 Encounters:   25 79.8 kg (176 lb)   06/10/25 81.6 kg (180 lb)   25 82.6 kg (182 lb)   25 81.6 kg (180 lb)   25 83.5 kg (184 lb)   25 86.5 kg (190 lb 9.6 oz)   25 96.2 kg (212 lb)   25 95.3 kg (210 lb)   25 105 kg (231 lb)   24 105 kg (231 lb)     Lab Results   Component Value Date    HGBA1C 5.8 (H) 2025      POTASSIUM   Date Value Ref Range Status   2025 4.1 3.5 - 5.3 mmol/L Final      GLUCOSE   Date Value Ref Range Status   2025 113 (H) 65 - 99 mg/dL Final     Comment:                   Fasting reference interval     For someone without known diabetes, a glucose value  between 100 and 125 mg/dL is consistent with  prediabetes and should be confirmed with a  follow-up test.            Calculations:    IBW: Patient weight not recorded   Protein need calculated (1.5g/kg IBW) = 72g  Protein intake recommended: 77g = 11oz     Daily Meal Plan:   Mornin oz protein          Afternoon:  5 oz protein + 1 vegetable from the list   Evenin oz protein + 1 vegetable from the list   Fluids:        64-96oz per day     Recommended supplements:  Potassium as directed with breakfast and dinner (prescription)  Fiber capsules 1-2 times daily or sugar-free fiber  powder (as tolerated)   Colace (stool softener) as needed, up to 4 capsules/day  Calcium: 500-600 mg, pill form twice daily with breakfast or lunch AND dinner  Multivitamin tablet or capsule per day that is low in iron (i.e. Centrum Silver)  Magnesium: 200-250 mg, pill form with dinner or at bedtime  Bouillon cube one to two times per day as needed, if feeling dizzy   Beginning with the fourth morning of this diet, test urine every morning using Ketostix for ketone (fat) breakdown.

## 2025-07-16 ENCOUNTER — APPOINTMENT (OUTPATIENT)
Dept: OPHTHALMOLOGY | Facility: CLINIC | Age: 50
End: 2025-07-16
Payer: COMMERCIAL

## 2025-07-16 DIAGNOSIS — H40.1131 PRIMARY OPEN ANGLE GLAUCOMA (POAG) OF BOTH EYES, MILD STAGE: Primary | ICD-10-CM

## 2025-07-16 PROCEDURE — 99213 OFFICE O/P EST LOW 20 MIN: CPT | Performed by: OPHTHALMOLOGY

## 2025-07-16 PROCEDURE — 92083 EXTENDED VISUAL FIELD XM: CPT | Performed by: OPHTHALMOLOGY

## 2025-07-16 ASSESSMENT — REFRACTION_WEARINGRX
OD_SPHERE: -0.50
OS_CYLINDER: -0.75
OD_CYLINDER: -0.50
OS_AXIS: 140
OS_SPHERE: -0.25
SPECS_TYPE: SVL
OD_AXIS: 065

## 2025-07-16 ASSESSMENT — EXTERNAL EXAM - LEFT EYE: OS_EXAM: NORMAL

## 2025-07-16 ASSESSMENT — EXTERNAL EXAM - RIGHT EYE: OD_EXAM: NORMAL

## 2025-07-16 ASSESSMENT — PACHYMETRY
EXAM_DATE: 4/11/2024
OS_CT(UM): 571
OD_CT(UM): 585

## 2025-07-16 ASSESSMENT — TONOMETRY
OS_IOP_MMHG: 18
IOP_METHOD: GOLDMANN APPLANATION
OD_IOP_MMHG: 19

## 2025-07-16 ASSESSMENT — VISUAL ACUITY
OD_CC+: -1
OD_CC: 20/20
METHOD: SNELLEN - SINGLE
CORRECTION_TYPE: GLASSES
OS_CC: 20/20

## 2025-07-16 ASSESSMENT — SLIT LAMP EXAM - LIDS
COMMENTS: NORMAL
COMMENTS: NORMAL

## 2025-07-16 ASSESSMENT — PAIN SCALES - GENERAL: PAINLEVEL_OUTOF10: 0-NO PAIN

## 2025-07-16 NOTE — PROGRESS NOTES
History    Chief Complaint    Visual field testing       HPI    Pt here for glaucoma testing. Using Latanoprost QAM OU. No new floaters, no flashes, no eye pain, no tearing.  Last edited by MARE Dorsey on 7/16/2025  2:59 PM.        Problem List  Date Reviewed: 4/11/2024      Elevated blood pressure reading    Anxiety    Bacterial vaginitis    Benign hypertension    Blood pressure elevated without history of HTN    Breast lump on left side at 10 o'clock position    Burn    Cough    Snoring    COVID-19 virus infection    Daytime sleepiness    Dysmetabolic syndrome    Edema    Epigastric pain    Esophageal reflux    Fall at home    Fatigue    Forehead contusion    Headache    Hypokalemia    Right knee pain    Left knee pain    Medial meniscus tear    Menorrhagia    Mouth sore    Obesity    Ovarian cyst    Overweight    Pain of left upper extremity    Primary osteoarthritis of left knee    Sinus congestion    Sore throat    Shoulder pain, right    Stuffy and runny nose    Abnormal uterine bleeding (AUB)    Uterine leiomyoma    PONV (postoperative nausea and vomiting)       Past Medical History:   Diagnosis Date    Abnormal uterine bleeding     Acute pharyngitis, unspecified 01/14/2022    Sore throat    Anxiety disorder, unspecified 07/28/2021    Anxiety    Arthritis     left knee arthritis    Class 3 severe obesity with body mass index (BMI) of 40.0 to 44.9 in adult 03/08/2024    42.83 kg/m²    Contusion of other part of head, initial encounter 10/24/2017    Forehead contusion    Cough, unspecified 01/14/2022    Cough    COVID-19     COVID-19 virus infection    Dorsalgia, unspecified 05/13/2016    Back pain    Edema, unspecified 11/22/2017    Edema    Elevated blood-pressure reading, without diagnosis of hypertension 02/21/2022    Blood pressure elevated without history of HTN    Encounter for immunization 09/21/2020    Need for tetanus booster    Encounter for immunization 09/24/2021    Flu vaccine need     Encounter for other screening for malignant neoplasm of breast 12/16/2022    Breast screening    Encounter for screening for malignant neoplasm of colon 10/08/2021    Colon cancer screening    Epigastric pain 03/31/2016    Epigastric pain    Essential (primary) hypertension 07/15/2022    Benign hypertension    GERD (gastroesophageal reflux disease)     Hypertension     Hypokalemia 05/13/2016    Hypokalemia    Nasal congestion 03/21/2022    Sinus congestion    Other abnormal and inconclusive findings on diagnostic imaging of breast 10/12/2020    Abnormal mammogram    Other fatigue 12/17/2021    Fatigue    Other lesions of oral mucosa 07/19/2016    Mouth sore    Other specified disorders of nose and nasal sinuses 01/14/2022    Stuffy and runny nose    Ovarian cyst     Ovarian cyst    Overweight 10/18/2019    Overweight    Pain in left arm 02/21/2022    Pain of left upper extremity    Pain in right toe(s) 09/24/2021    Toe pain, right    Pain, unspecified 02/21/2022    Aches    Somnolence 12/17/2021    Daytime sleepiness    Unspecified abdominal pain 03/31/2016    Abdominal pain    Unspecified fall, initial encounter 10/24/2017    Fall at home    Uterine leiomyoma     Viral intestinal infection, unspecified 04/20/2022    Viral gastroenteritis    Vitamin D deficiency, unspecified 03/21/2022    Vitamin D deficiency     Past Surgical History:   Procedure Laterality Date    APPENDECTOMY  09/08/2014    Appendectomy    DILATION AND CURETTAGE OF UTERUS  09/08/2014    Dilation And Curettage    ENDOMETRIAL ABLATION N/A 03/20/2024    Hydrothermal endometrial ablation    MOUTH SURGERY  09/08/2014    Oral Surgery Tooth Extraction    OTHER SURGICAL HISTORY  11/29/2018    Knee surgery     SOCIAL HISTORY   SMOKING:  reports that she has never smoked. She has never been exposed to tobacco smoke. She has never used smokeless tobacco.  DRUG USE:    reports no history of drug use.    FAMILY HISTORY  family history includes Breast cancer  (age of onset: 70) in her paternal grandmother; Heart disease in her father; Thyroid cancer in her mother; eating do in her sister.    CURRENT MEDICATIONS  Current Outpatient Medications (Ophthalmology pharm classes)   Medication Sig Dispense Refill    latanoprost (Xalatan) 0.005 % ophthalmic solution ADMINISTER 1 DROP INTO BOTH EYES ONCE DAILY AT BEDTIME. (Patient taking differently: Administer 1 drop into both eyes once daily in the morning.) 7.5 mL 3     Current Outpatient Medications (Other)   Medication Sig Dispense Refill    B complex-vitamin C tablet Take 1 tablet by mouth once daily.      cholecalciferol, vitamin D3, (VITAMIN D3 ORAL) Take by mouth.      drospirenone, contraceptive, 4 mg (28) tablet Take 1 tablet by mouth once daily. 90 tablet 3    hydroCHLOROthiazide (Microzide) 12.5 mg capsule TAKE 1 CAPSULE (12.5 MG) BY MOUTH ONCE EVERY 24 HOURS. 90 capsule 1    ibuprofen 200 mg tablet Take 3 tablets (600 mg) by mouth every 6 hours.      MAGNESIUM ORAL Take by mouth.      medroxyPROGESTERone (Provera) 10 mg tablet Take 1 tablet (10 mg) by mouth once daily. 14 tablet 1    metFORMIN (Glucophage) 500 mg tablet TAKE 1 TABLET (500 MG) BY MOUTH 2 TIMES DAILY (MORNING AND LATE AFTERNOON). 180 tablet 0    multivitamin tablet Take 1 tablet by mouth once daily.      potassium chloride CR 10 mEq ER tablet TAKE 1 TABLET (10 MEQ) BY MOUTH 2 TIMES A DAY. DO NOT CRUSH, CHEW, OR SPLIT. 180 tablet 1    atorvastatin (Lipitor) 10 mg tablet Take 1 tablet (10 mg) by mouth once daily. 60 tablet 0    azelastine (Astelin) 137 mcg (0.1 %) nasal spray Administer 1 spray into each nostril 2 times a day. Use in each nostril as directed 30 mL 12       Exam   Visual Acuity (Snellen - Single)         Right Left    Dist cc 20/20 -1 20/20      Correction: Glasses              Edited by: Yazan Berry, MARE              Wearing Rx       Wearing Rx         Sphere Cylinder Axis    Right -0.50 -0.50 065    Left -0.25 -0.75 140      Type:  "SVL                  Not recorded       Pupils       Pupils         Dark Light Shape React APD    Right 5 4 Round 1 None    Left 5 4 Round 1 None                  Intraocular pressure was 19 in the right eye and 18 in the left eye using Goldmann Applanation.  Not recorded       Not recorded       Not recorded        External Exam         Right Left    External Normal Normal              Slit Lamp Exam         Right Left    Lids/Lashes Normal Normal    Conjunctiva/Sclera White and quiet White and quiet    Cornea Clear Clear    Anterior Chamber Deep and quiet Deep and quiet    Iris Round and reactive Round and reactive    Lens Clear Clear    Anterior Vitreous Normal Normal                   <div id=\"[email or web address]\"></div>         Diagnostics  [facility] Visual Field - OU - Both Eyes          Non glaucomatous OU                   Plan   -  The encounter diagnosis was Primary open angle glaucoma (POAG) of both eyes, mild stage.  -  Referred By:  Delilah Matthew MD  -  IOP:  Last Tonometry OD 19 / OS 18 /Date 3:03 PM      Target OD: No Value exists for the : EPIC#YGQ173 Target OS: No Value exists for the : EPIC#LWE808       Max pressure OD:   Date:         Max Pressure OS:   Date:    -    Not recorded         -    Not recorded       -  Pathophysiology of glaucoma, and potential blinding nature of disease reviewed.      Importance of follow up and compliance stressed  -ocular htn glaucoma suspect based on possible elevated IOP at outside provider  -IOP improved with improved compliance with am administration  -continue qam latanoprost OU    Patient still considering SLT as an option down the road  Rtc 6 months for DFE/OCT RNFL      "

## 2025-07-28 ENCOUNTER — APPOINTMENT (OUTPATIENT)
Dept: ENDOCRINOLOGY | Facility: CLINIC | Age: 50
End: 2025-07-28
Payer: COMMERCIAL

## 2025-07-28 VITALS — BODY MASS INDEX: 32.12 KG/M2 | WEIGHT: 170 LBS

## 2025-07-28 NOTE — PROGRESS NOTES
Patient Discussion/Summary:   Pt recently went on vacation, does admit to eating some carbs but in a healthy way  Not testing ketones  Goal wt remains 140-150lbs  Limited alcohol intake  Fully resuming PSMF today   Plans to also resume exercise routine (cardio and weights)    USUAL DIET RECALL:  B: 2 eggs + SF yogurt  L: protein + veggie combo  D: protein + veggie combo  Snacks: string cheese  Drinks: Coke Zero, water    Wt Readings from Last 10 Encounters:   25 77.1 kg (170 lb)   25 79.8 kg (176 lb)   06/10/25 81.6 kg (180 lb)   25 82.6 kg (182 lb)   25 81.6 kg (180 lb)   25 83.5 kg (184 lb)   25 86.5 kg (190 lb 9.6 oz)   25 96.2 kg (212 lb)   25 95.3 kg (210 lb)   25 105 kg (231 lb)       Lab Results   Component Value Date    HGBA1C 5.8 (H) 2025      POTASSIUM   Date Value Ref Range Status   2025 4.1 3.5 - 5.3 mmol/L Final      GLUCOSE   Date Value Ref Range Status   2025 113 (H) 65 - 99 mg/dL Final     Comment:                   Fasting reference interval     For someone without known diabetes, a glucose value  between 100 and 125 mg/dL is consistent with  prediabetes and should be confirmed with a  follow-up test.            Calculations:    IBW: Patient weight not recorded   Protein need calculated (1.5g/kg IBW) = 72g  Protein intake recommended: 77g = 11oz     Daily Meal Plan:   Mornin oz protein          Afternoon:  5 oz protein + 1 vegetable from the list   Evenin oz protein + 1 vegetable from the list   Fluids:        64-96oz per day     Recommended supplements:  Potassium as directed with breakfast and dinner (prescription)  Fiber capsules 1-2 times daily or sugar-free fiber powder (as tolerated)   Colace (stool softener) as needed, up to 4 capsules/day  Calcium: 500-600 mg, pill form twice daily with breakfast or lunch AND dinner  Multivitamin tablet or capsule per day that is low in iron (i.e. Centrum  Silver)  Magnesium: 200-250 mg, pill form with dinner or at bedtime  Bouillon cube one to two times per day as needed, if feeling dizzy   Beginning with the fourth morning of this diet, test urine every morning using Ketostix for ketone (fat) breakdown.

## 2025-08-18 ENCOUNTER — APPOINTMENT (OUTPATIENT)
Dept: ENDOCRINOLOGY | Facility: CLINIC | Age: 50
End: 2025-08-18
Payer: COMMERCIAL

## 2025-08-20 ENCOUNTER — APPOINTMENT (OUTPATIENT)
Dept: OBSTETRICS AND GYNECOLOGY | Facility: CLINIC | Age: 50
End: 2025-08-20
Payer: COMMERCIAL

## 2025-08-20 VITALS
BODY MASS INDEX: 32.47 KG/M2 | HEIGHT: 61 IN | DIASTOLIC BLOOD PRESSURE: 78 MMHG | SYSTOLIC BLOOD PRESSURE: 120 MMHG | WEIGHT: 172 LBS

## 2025-08-20 DIAGNOSIS — D21.9 FIBROIDS: Primary | ICD-10-CM

## 2025-08-20 DIAGNOSIS — N93.9 ABNORMAL UTERINE BLEEDING (AUB): ICD-10-CM

## 2025-08-20 PROCEDURE — 3074F SYST BP LT 130 MM HG: CPT | Performed by: STUDENT IN AN ORGANIZED HEALTH CARE EDUCATION/TRAINING PROGRAM

## 2025-08-20 PROCEDURE — 3078F DIAST BP <80 MM HG: CPT | Performed by: STUDENT IN AN ORGANIZED HEALTH CARE EDUCATION/TRAINING PROGRAM

## 2025-08-20 PROCEDURE — 3008F BODY MASS INDEX DOCD: CPT | Performed by: STUDENT IN AN ORGANIZED HEALTH CARE EDUCATION/TRAINING PROGRAM

## 2025-08-20 PROCEDURE — 99204 OFFICE O/P NEW MOD 45 MIN: CPT | Performed by: STUDENT IN AN ORGANIZED HEALTH CARE EDUCATION/TRAINING PROGRAM

## 2025-08-20 SDOH — ECONOMIC STABILITY: TRANSPORTATION INSECURITY
IN THE PAST 12 MONTHS, HAS THE LACK OF TRANSPORTATION KEPT YOU FROM MEDICAL APPOINTMENTS OR FROM GETTING MEDICATIONS?: NO

## 2025-08-20 SDOH — ECONOMIC STABILITY: TRANSPORTATION INSECURITY
IN THE PAST 12 MONTHS, HAS LACK OF TRANSPORTATION KEPT YOU FROM MEETINGS, WORK, OR FROM GETTING THINGS NEEDED FOR DAILY LIVING?: NO

## 2025-08-20 ASSESSMENT — PATIENT HEALTH QUESTIONNAIRE - PHQ9
2. FEELING DOWN, DEPRESSED OR HOPELESS: NOT AT ALL
1. LITTLE INTEREST OR PLEASURE IN DOING THINGS: NOT AT ALL
SUM OF ALL RESPONSES TO PHQ9 QUESTIONS 1 & 2: 0

## 2025-08-20 ASSESSMENT — LIFESTYLE VARIABLES
AUDIT-C TOTAL SCORE: 0
HOW OFTEN DO YOU HAVE SIX OR MORE DRINKS ON ONE OCCASION: NEVER
HOW OFTEN DO YOU HAVE A DRINK CONTAINING ALCOHOL: NEVER
HOW MANY STANDARD DRINKS CONTAINING ALCOHOL DO YOU HAVE ON A TYPICAL DAY: PATIENT DOES NOT DRINK
SKIP TO QUESTIONS 9-10: 1

## 2025-08-20 ASSESSMENT — PAIN SCALES - GENERAL: PAINLEVEL_OUTOF10: 0-NO PAIN

## 2025-08-21 DIAGNOSIS — H40.053 BILATERAL OCULAR HYPERTENSION: Primary | ICD-10-CM

## 2025-08-21 RX ORDER — LATANOPROST 50 UG/ML
1 SOLUTION/ DROPS OPHTHALMIC EVERY MORNING
Qty: 7.5 ML | Refills: 3 | Status: SHIPPED | OUTPATIENT
Start: 2025-08-21 | End: 2026-08-21

## 2025-08-25 ENCOUNTER — TELEPHONE (OUTPATIENT)
Dept: OBSTETRICS AND GYNECOLOGY | Facility: CLINIC | Age: 50
End: 2025-08-25
Payer: COMMERCIAL

## 2025-08-26 ENCOUNTER — DOCUMENTATION (OUTPATIENT)
Dept: OBSTETRICS AND GYNECOLOGY | Facility: CLINIC | Age: 50
End: 2025-08-26
Payer: COMMERCIAL

## 2025-09-06 LAB
ALBUMIN SERPL-MCNC: 4.3 G/DL (ref 3.6–5.1)
ALP SERPL-CCNC: 50 U/L (ref 31–125)
ALT SERPL-CCNC: 13 U/L (ref 6–29)
ANION GAP SERPL CALCULATED.4IONS-SCNC: 10 MMOL/L (CALC) (ref 7–17)
AST SERPL-CCNC: 13 U/L (ref 10–35)
BILIRUB SERPL-MCNC: 0.6 MG/DL (ref 0.2–1.2)
BUN SERPL-MCNC: 16 MG/DL (ref 7–25)
CALCIUM SERPL-MCNC: 9.3 MG/DL (ref 8.6–10.2)
CHLORIDE SERPL-SCNC: 103 MMOL/L (ref 98–110)
CO2 SERPL-SCNC: 25 MMOL/L (ref 20–32)
CREAT SERPL-MCNC: 0.63 MG/DL (ref 0.5–0.99)
EGFRCR SERPLBLD CKD-EPI 2021: 109 ML/MIN/1.73M2
EST. AVERAGE GLUCOSE BLD GHB EST-MCNC: 114 MG/DL
EST. AVERAGE GLUCOSE BLD GHB EST-SCNC: 6.3 MMOL/L
GLUCOSE SERPL-MCNC: 96 MG/DL (ref 65–99)
HBA1C MFR BLD: 5.6 %
POTASSIUM SERPL-SCNC: 4.4 MMOL/L (ref 3.5–5.3)
PROT SERPL-MCNC: 6.9 G/DL (ref 6.1–8.1)
SODIUM SERPL-SCNC: 138 MMOL/L (ref 135–146)

## 2025-09-08 ENCOUNTER — APPOINTMENT (OUTPATIENT)
Dept: ENDOCRINOLOGY | Facility: CLINIC | Age: 50
End: 2025-09-08
Payer: COMMERCIAL

## 2025-09-26 ENCOUNTER — APPOINTMENT (OUTPATIENT)
Dept: OTOLARYNGOLOGY | Facility: CLINIC | Age: 50
End: 2025-09-26
Payer: COMMERCIAL

## 2025-09-29 ENCOUNTER — APPOINTMENT (OUTPATIENT)
Dept: CARE COORDINATION | Facility: CLINIC | Age: 50
End: 2025-09-29
Payer: COMMERCIAL

## 2025-11-26 ENCOUNTER — APPOINTMENT (OUTPATIENT)
Dept: PRIMARY CARE | Facility: CLINIC | Age: 50
End: 2025-11-26
Payer: COMMERCIAL

## 2026-02-02 ENCOUNTER — APPOINTMENT (OUTPATIENT)
Dept: OPHTHALMOLOGY | Facility: CLINIC | Age: 51
End: 2026-02-02
Payer: COMMERCIAL

## (undated) DEVICE — GLOVE, SURGICAL, PROTEXIS PI MICRO, 7.5, PF, LF

## (undated) DEVICE — COVER HANDLE LIGHT, STERIS, BLUE, STERILE

## (undated) DEVICE — DRAPE, SHEET, FAN FOLDED, HALF, 44 X 58 IN, DISPOSABLE, LF, STERILE

## (undated) DEVICE — SUTURE, CTD, VICRYL 0, CT-2, 27 IN, VIOLET BRAIDED

## (undated) DEVICE — SOLUTION, INJECTION, USP, SODIUM CHLORIDE 0.9%, .9, NACL, 1000 ML, BAG

## (undated) DEVICE — PROCEDURE SET, GENESYS HTA, SINGLE

## (undated) DEVICE — Device

## (undated) DEVICE — SOLUTION, IRRIGATION, USP, SODIUM CHLORIDE 0.9%, 3000 ML, BAG